# Patient Record
Sex: FEMALE | Race: WHITE | Employment: FULL TIME | URBAN - METROPOLITAN AREA
[De-identification: names, ages, dates, MRNs, and addresses within clinical notes are randomized per-mention and may not be internally consistent; named-entity substitution may affect disease eponyms.]

---

## 2017-02-13 ENCOUNTER — HOSPITAL ENCOUNTER (OUTPATIENT)
Age: 32
Discharge: OTHER INSTITUTION | End: 2017-02-13
Payer: COMMERCIAL

## 2017-02-13 ENCOUNTER — APPOINTMENT (OUTPATIENT)
Dept: ULTRASOUND IMAGING | Facility: HOSPITAL | Age: 32
End: 2017-02-13
Attending: EMERGENCY MEDICINE
Payer: COMMERCIAL

## 2017-02-13 ENCOUNTER — HOSPITAL ENCOUNTER (EMERGENCY)
Facility: HOSPITAL | Age: 32
Discharge: HOME | End: 2017-02-13
Attending: EMERGENCY MEDICINE
Payer: COMMERCIAL

## 2017-02-13 VITALS
BODY MASS INDEX: 36.16 KG/M2 | DIASTOLIC BLOOD PRESSURE: 70 MMHG | HEART RATE: 95 BPM | WEIGHT: 225 LBS | SYSTOLIC BLOOD PRESSURE: 133 MMHG | OXYGEN SATURATION: 100 % | RESPIRATION RATE: 18 BRPM | HEIGHT: 66 IN

## 2017-02-13 VITALS
HEIGHT: 66 IN | SYSTOLIC BLOOD PRESSURE: 130 MMHG | HEART RATE: 94 BPM | WEIGHT: 230 LBS | OXYGEN SATURATION: 100 % | BODY MASS INDEX: 36.96 KG/M2 | TEMPERATURE: 98 F | DIASTOLIC BLOOD PRESSURE: 70 MMHG | RESPIRATION RATE: 16 BRPM

## 2017-02-13 DIAGNOSIS — R10.9 ABDOMINAL PAIN OF UNKNOWN ETIOLOGY: Primary | ICD-10-CM

## 2017-02-13 DIAGNOSIS — R10.9 ABDOMINAL PAIN, ACUTE: Primary | ICD-10-CM

## 2017-02-13 LAB
B-HCG UR QL: NEGATIVE
BACTERIA UR QL AUTO: NEGATIVE /HPF
BILIRUB UR QL: NEGATIVE
COLOR UR: YELLOW
GLUCOSE UR-MCNC: NEGATIVE MG/DL
HGB UR QL STRIP.AUTO: NEGATIVE
KETONES UR-MCNC: NEGATIVE MG/DL
NITRITE UR QL STRIP.AUTO: NEGATIVE
PH UR: 6 [PH] (ref 5–8)
PROT UR-MCNC: NEGATIVE MG/DL
RBC #/AREA URNS AUTO: 1 /HPF
SP GR UR STRIP: 1.03 (ref 1–1.03)
UROBILINOGEN UR STRIP-ACNC: <2
VIT C UR-MCNC: NEGATIVE MG/DL
WBC #/AREA URNS AUTO: 2 /HPF

## 2017-02-13 PROCEDURE — 99284 EMERGENCY DEPT VISIT MOD MDM: CPT

## 2017-02-13 PROCEDURE — 93975 VASCULAR STUDY: CPT

## 2017-02-13 PROCEDURE — 76856 US EXAM PELVIC COMPLETE: CPT

## 2017-02-13 PROCEDURE — 76830 TRANSVAGINAL US NON-OB: CPT

## 2017-02-13 PROCEDURE — 81025 URINE PREGNANCY TEST: CPT

## 2017-02-13 PROCEDURE — 99215 OFFICE O/P EST HI 40 MIN: CPT

## 2017-02-13 PROCEDURE — 81001 URINALYSIS AUTO W/SCOPE: CPT

## 2017-02-13 RX ORDER — IBUPROFEN 600 MG/1
600 TABLET ORAL ONCE
Status: COMPLETED | OUTPATIENT
Start: 2017-02-13 | End: 2017-02-13

## 2017-02-13 RX ORDER — ACETAMINOPHEN 500 MG
1000 TABLET ORAL ONCE
Status: COMPLETED | OUTPATIENT
Start: 2017-02-13 | End: 2017-02-13

## 2017-02-14 NOTE — ED NOTES
Pt presents to ED from [address] [place] [address] for evaluation of LLQ pain. Pt states  Hx of ovarian cysts x2 on left side. Denies vaginal bleeding.

## 2017-02-14 NOTE — ED PROVIDER NOTES
Patient Seen in: [facility] In [place]    History   CC: abd pain  HPI: Julia Parikh 33year old female  who presents to the ER c/o periumbilical and left lower quadrant abdominal pain that has been constant in nature since yeste Current:/70 mmHg  Pulse 94  Temp(Src) 98.4 °F (36.9 °C) (Temporal)  Resp 16  Ht 167.6 cm (5' 6\")  Wt 104. 327 kg  BMI 37.14 kg/m2  SpO2 100%        General - Appears well and in NAD  Resp - Lung sounds clear bilaterally and wob unlabored, good

## 2017-02-14 NOTE — ED INITIAL ASSESSMENT (HPI)
LLQ/suprapubic pain starting yesterday, hx of ovarian cysts, pt suspects she has a cyst.  No meds taken PTA. Denies n/v/d/dysuria.

## 2017-02-14 NOTE — ED NOTES
Rn discussed case with Dr Miriam sEpinal, orders received. Urine collected and sent, pt now drinking to fill bladder. Pt instructed to notify triage RN when bladder is full.

## 2017-02-14 NOTE — ED PROVIDER NOTES
Patient Seen in: [facility] Emergency Department    History   Patient presents with:  Abdomen/Flank Pain (GI/)    Stated Complaint: abd pain    HPI    Patient complains of llq/inguinal  abdominal pain that began yesterday feels like previous ova air          Physical Exam  General Appearance: alert, no distress  Eyes: pupils equal and round no pallor or injection  ENT, Mouth: mucous membranes moist  Respiratory: there are no retractions, lungs are clear to auscultation  Cardiovascular: regular rat

## 2017-02-18 ENCOUNTER — HOSPITAL ENCOUNTER (OUTPATIENT)
Dept: CT IMAGING | Age: 32
Discharge: HOME | End: 2017-02-18
Attending: FAMILY MEDICINE
Payer: COMMERCIAL

## 2017-02-18 DIAGNOSIS — R10.9 ABDOMINAL PAIN: ICD-10-CM

## 2017-02-18 PROCEDURE — 74176 CT ABD & PELVIS W/O CONTRAST: CPT

## 2017-08-03 ENCOUNTER — OFFICE VISIT (OUTPATIENT)
Dept: FAMILY MEDICINE CLINIC | Facility: CLINIC | Age: 32
End: 2017-08-03

## 2017-08-03 VITALS
WEIGHT: 233 LBS | HEART RATE: 81 BPM | SYSTOLIC BLOOD PRESSURE: 113 MMHG | BODY MASS INDEX: 37.45 KG/M2 | HEIGHT: 66 IN | DIASTOLIC BLOOD PRESSURE: 76 MMHG

## 2017-08-03 DIAGNOSIS — Z00.00 ROUTINE PHYSICAL EXAMINATION: Primary | ICD-10-CM

## 2017-08-03 DIAGNOSIS — K57.93 DIVERTICULITIS OF INTESTINE WITH BLEEDING, UNSPECIFIED COMPLICATION STATUS, UNSPECIFIED PART OF INTESTINAL TRACT: ICD-10-CM

## 2017-08-03 PROCEDURE — 99395 PREV VISIT EST AGE 18-39: CPT | Performed by: FAMILY MEDICINE

## 2017-08-03 NOTE — PROGRESS NOTES
Blood pressure 113/76, pulse 81, height 5' 6\" (1.676 m), weight 233 lb (105.7 kg), currently breastfeeding. REASON FOR VISIT:    Tati Grimm is a 27year old female who presents for an [address]         Patient Active Problem List: found for: FOB, OCCULTSTOOL    Obesity Screening Screen all adults annually Body mass index is 37.61 kg/m².       Preventive Services for Which Recommendations Vary with Risk Recommendation Internal Lab or Procedure External Lab or Procedure   Cholesterol S on file.    MEDICAL INFORMATION:   Past Medical History:   Diagnosis Date   • Diverticulitis    • History of pregnancy 2013    HALLE Nesbitt, , APGAR:8 (1 min) 9 (5 min)  (10 min)  -2nd degree perineal laceration; Pregnancy management: -Ping Fossa / Tameka Pace lesions  HEENT: atraumatic, normocephalic, ears and throat are clear  EYES:PERRLA, EOMI, normal optic disk, conjunctiva are clear  NECK: supple, no adenopathy, no bruits  CHEST: no chest tenderness    LUNGS: clear to auscultation  CARDIO: RRR without murmu

## 2017-08-04 ENCOUNTER — OFFICE VISIT (OUTPATIENT)
Dept: DERMATOLOGY CLINIC | Facility: CLINIC | Age: 32
End: 2017-08-04

## 2017-08-04 DIAGNOSIS — D23.70 BENIGN NEOPLASM OF SKIN OF LOWER LIMB, INCLUDING HIP, UNSPECIFIED LATERALITY: ICD-10-CM

## 2017-08-04 DIAGNOSIS — D23.30 BENIGN NEOPLASM OF SKIN OF FACE: ICD-10-CM

## 2017-08-04 DIAGNOSIS — D23.4 BENIGN NEOPLASM OF SCALP AND SKIN OF NECK: ICD-10-CM

## 2017-08-04 DIAGNOSIS — D22.9 MULTIPLE NEVI: Primary | ICD-10-CM

## 2017-08-04 DIAGNOSIS — D23.5 BENIGN NEOPLASM OF SKIN OF TRUNK, EXCEPT SCROTUM: ICD-10-CM

## 2017-08-04 DIAGNOSIS — D23.60 BENIGN NEOPLASM OF SKIN OF UPPER LIMB, INCLUDING SHOULDER, UNSPECIFIED LATERALITY: ICD-10-CM

## 2017-08-04 PROCEDURE — 99213 OFFICE O/P EST LOW 20 MIN: CPT | Performed by: DERMATOLOGY

## 2017-08-04 PROCEDURE — 99212 OFFICE O/P EST SF 10 MIN: CPT | Performed by: DERMATOLOGY

## 2017-08-10 ENCOUNTER — APPOINTMENT (OUTPATIENT)
Dept: LAB | Age: 32
End: 2017-08-10
Attending: FAMILY MEDICINE
Payer: COMMERCIAL

## 2017-08-10 DIAGNOSIS — K57.93 DIVERTICULITIS OF INTESTINE WITH BLEEDING, UNSPECIFIED COMPLICATION STATUS, UNSPECIFIED PART OF INTESTINAL TRACT: ICD-10-CM

## 2017-08-10 DIAGNOSIS — Z00.00 ROUTINE PHYSICAL EXAMINATION: ICD-10-CM

## 2017-08-10 LAB
CHOLEST SERPL-MCNC: 214 MG/DL (ref 110–200)
GLUCOSE SERPL-MCNC: 84 MG/DL (ref 70–99)
HDLC SERPL-MCNC: 37 MG/DL
NONHDLC SERPL-MCNC: 177 MG/DL
TRIGL SERPL-MCNC: 450 MG/DL (ref 1–149)

## 2017-08-10 PROCEDURE — 36415 COLL VENOUS BLD VENIPUNCTURE: CPT

## 2017-08-10 PROCEDURE — 80061 LIPID PANEL: CPT

## 2017-08-10 PROCEDURE — 82947 ASSAY GLUCOSE BLOOD QUANT: CPT

## 2017-08-14 ENCOUNTER — TELEPHONE (OUTPATIENT)
Dept: OBGYN CLINIC | Facility: CLINIC | Age: 32
End: 2017-08-14

## 2017-08-14 NOTE — PROGRESS NOTES
Thony Bello is a 27year old female. HPI:     CC:  Patient presents with:  Full Skin Exam: LOV 7/2016. Pt here for annual skin exam. Concerned with 2 moles on back that look larger. Denies family and personal hx of skin ca.          Allergies:  Review Marital status:   Spouse name: N/A    Years of education: N/A  Number of children: N/A     Occupational History  None on file     Social History Main Topics   Smoking status: Never Smoker    Smokeless tobacco: Never Used    Alcohol use Yes    Commen scattered, cherry-red vascular papules scattered. See map today's date for lesions noted . Mid back lesions observed. Darker brown macules on back observe. No significant change in lesion left scapula.   Papular lesions at right interscapular back, cancer, ABCDE's of melanoma discussed with patient. Sunscreen use, sun protection, self exams reviewed. Followup as noted RTC routine checkup 6 mos - one year or p.r.n.     RTC one year or when necessary

## 2017-09-14 ENCOUNTER — OFFICE VISIT (OUTPATIENT)
Dept: GASTROENTEROLOGY | Facility: CLINIC | Age: 32
End: 2017-09-14

## 2017-09-14 ENCOUNTER — TELEPHONE (OUTPATIENT)
Dept: GASTROENTEROLOGY | Facility: CLINIC | Age: 32
End: 2017-09-14

## 2017-09-14 VITALS
DIASTOLIC BLOOD PRESSURE: 75 MMHG | BODY MASS INDEX: 37.18 KG/M2 | HEIGHT: 66 IN | HEART RATE: 85 BPM | WEIGHT: 231.38 LBS | SYSTOLIC BLOOD PRESSURE: 122 MMHG

## 2017-09-14 DIAGNOSIS — R10.32 ABDOMINAL DISCOMFORT IN LEFT LOWER QUADRANT: ICD-10-CM

## 2017-09-14 DIAGNOSIS — Z87.19 HISTORY OF DIVERTICULITIS OF COLON: Primary | ICD-10-CM

## 2017-09-14 PROCEDURE — 99244 OFF/OP CNSLTJ NEW/EST MOD 40: CPT | Performed by: INTERNAL MEDICINE

## 2017-09-14 NOTE — TELEPHONE ENCOUNTER
Scheduled for:  Colon  Provider Name:   Date:  9/18/17  Location:  [facility]  Sedation:   Iv sedation  Time:  8:45am, arrival 7:45am  Prep: Suprep  Meds/Allergies Reconciled?:  yes  Diagnosis with codes:  Hx of diverticulitis of colon Z87.19  Was patient informed

## 2017-09-14 NOTE — H&P
[facility] - Gastroenterology                                                                                                  Clinic History and Physical resolved      Past Surgical History:  1998, 2001: KNEE ARTHROSCOPY      Comment: knee cap damage; L knee pain  1998: OTHER SURGICAL HISTORY      Comment: L orthoscopic knee   Family Hx:   Family History   Problem Relation Age of Onset   • Heart Disease Mot perforation without drainable fluid collection treated with antibiotics which resolved and only now having some changes in bowel habits in regards to consistency and some abdominal discomfort in the LLQ.     Discussed possiblities of current symptoms being

## 2017-09-14 NOTE — PATIENT INSTRUCTIONS
1. Schedule colonoscopy with Dr. Griselda So with IV conscious sedation    2.  bowel prep from pharmacy (split suprep)    3. Continue all medications for procedure    4. Read all bowel prep instructions carefully    5.  AVOID seeds, nuts, popcorn, raw

## 2017-09-18 ENCOUNTER — SURGERY (OUTPATIENT)
Age: 32
End: 2017-09-18

## 2017-09-18 ENCOUNTER — HOSPITAL ENCOUNTER (OUTPATIENT)
Facility: HOSPITAL | Age: 32
Setting detail: HOSPITAL OUTPATIENT SURGERY
Discharge: HOME | End: 2017-09-18
Attending: INTERNAL MEDICINE | Admitting: INTERNAL MEDICINE
Payer: COMMERCIAL

## 2017-09-18 VITALS
WEIGHT: 231 LBS | HEART RATE: 94 BPM | SYSTOLIC BLOOD PRESSURE: 124 MMHG | DIASTOLIC BLOOD PRESSURE: 86 MMHG | HEIGHT: 66 IN | RESPIRATION RATE: 14 BRPM | BODY MASS INDEX: 37.12 KG/M2 | OXYGEN SATURATION: 99 %

## 2017-09-18 DIAGNOSIS — Z87.19 HISTORY OF DIVERTICULITIS OF COLON: ICD-10-CM

## 2017-09-18 DIAGNOSIS — R10.32 ABDOMINAL DISCOMFORT IN LEFT LOWER QUADRANT: ICD-10-CM

## 2017-09-18 LAB — B-HCG UR QL: NEGATIVE

## 2017-09-18 PROCEDURE — 0DBL8ZX EXCISION OF TRANSVERSE COLON, VIA NATURAL OR ARTIFICIAL OPENING ENDOSCOPIC, DIAGNOSTIC: ICD-10-PCS | Performed by: INTERNAL MEDICINE

## 2017-09-18 PROCEDURE — 45385 COLONOSCOPY W/LESION REMOVAL: CPT | Performed by: INTERNAL MEDICINE

## 2017-09-18 PROCEDURE — 99152 MOD SED SAME PHYS/QHP 5/>YRS: CPT | Performed by: INTERNAL MEDICINE

## 2017-09-18 RX ORDER — SODIUM CHLORIDE 0.9 % (FLUSH) 0.9 %
10 SYRINGE (ML) INJECTION AS NEEDED
Status: DISCONTINUED | OUTPATIENT
Start: 2017-09-18 | End: 2017-09-18

## 2017-09-18 RX ORDER — MIDAZOLAM HYDROCHLORIDE 1 MG/ML
1 INJECTION INTRAMUSCULAR; INTRAVENOUS EVERY 5 MIN PRN
Status: DISCONTINUED | OUTPATIENT
Start: 2017-09-18 | End: 2017-09-18

## 2017-09-18 RX ORDER — SODIUM CHLORIDE, SODIUM LACTATE, POTASSIUM CHLORIDE, CALCIUM CHLORIDE 600; 310; 30; 20 MG/100ML; MG/100ML; MG/100ML; MG/100ML
INJECTION, SOLUTION INTRAVENOUS CONTINUOUS
Status: DISCONTINUED | OUTPATIENT
Start: 2017-09-18 | End: 2017-09-18

## 2017-09-18 RX ORDER — MIDAZOLAM HYDROCHLORIDE 1 MG/ML
INJECTION INTRAMUSCULAR; INTRAVENOUS
Status: DISCONTINUED | OUTPATIENT
Start: 2017-09-18 | End: 2017-09-18

## 2017-09-18 NOTE — OPERATIVE REPORT
Colonoscopy Report    Mari Leonardo     1985 Age 27year old   PCP Cari Lozano MD, Ileana Shaffer MD Endoscopist Jamil Patrick MD     Date of procedure: 17    Procedure: Colonoscopy w/ cold snare polypectomy    Pre-operative diagnosis: history of di postoperative complications. The patient’s vital signs were monitored throughout the procedure and remained stable.     Estimated blood loss: insignificant    Specimens collected:  Colon polyp    Complications: none     Colonoscopy findings:  Terminal ileum

## 2017-09-18 NOTE — H&P
History & Physical Examination    Patient Name: Silvestre Hughes  MRN: [id number]  CSN: [id number]  YOB: 1985    Diagnosis: history of diverticulitis 2/2017      Prescriptions Prior to Admission:  Na Sulfate-K Sulfate-Mg Sulf (SUPREP BOWEL PRE reviewed the History and Physical done within the last 30 days. Any changes noted above.   Richa Upton MD  [address] [place] - Gastroenterology  9/18/2017  8:41 AM

## 2017-09-19 ENCOUNTER — TELEPHONE (OUTPATIENT)
Dept: GASTROENTEROLOGY | Facility: CLINIC | Age: 32
End: 2017-09-19

## 2017-09-19 NOTE — TELEPHONE ENCOUNTER
Recall colon in 5 years per. Colon done 9/18/17  Updates health maintenance  Entered into 5 yr CLN recall  I mailed out colonoscopy results letter to pt  RN Staff. Please recall patient for colonoscopy in 5 years.      Nichol Gavin

## 2017-09-22 ENCOUNTER — TELEPHONE (OUTPATIENT)
Dept: GASTROENTEROLOGY | Facility: CLINIC | Age: 32
End: 2017-09-22

## 2017-09-22 NOTE — TELEPHONE ENCOUNTER
Pt contacted and reviewed in detail Dr. Alee Parker results letter with her over the phone, she verbalized understanding of all and did not have further questions at this time. Letter reprinted and mailed to pt for her records.

## 2018-02-01 ENCOUNTER — OFFICE VISIT (OUTPATIENT)
Dept: OBGYN CLINIC | Facility: CLINIC | Age: 33
End: 2018-02-01

## 2018-02-01 VITALS
WEIGHT: 234 LBS | HEART RATE: 69 BPM | SYSTOLIC BLOOD PRESSURE: 112 MMHG | DIASTOLIC BLOOD PRESSURE: 72 MMHG | BODY MASS INDEX: 38 KG/M2

## 2018-02-01 DIAGNOSIS — N94.6 SEVERE DYSMENORRHEA: ICD-10-CM

## 2018-02-01 DIAGNOSIS — Z12.4 SCREENING FOR MALIGNANT NEOPLASM OF CERVIX: Primary | ICD-10-CM

## 2018-02-01 DIAGNOSIS — N92.0 MENORRHAGIA WITH REGULAR CYCLE: ICD-10-CM

## 2018-02-01 DIAGNOSIS — Z12.4 SCREENING FOR MALIGNANT NEOPLASM OF CERVIX: ICD-10-CM

## 2018-02-01 DIAGNOSIS — Z01.419 ENCOUNTER FOR GYNECOLOGICAL EXAMINATION WITHOUT ABNORMAL FINDING: ICD-10-CM

## 2018-02-01 DIAGNOSIS — Z01.419 ENCOUNTER FOR GYNECOLOGICAL EXAMINATION WITHOUT ABNORMAL FINDING: Primary | ICD-10-CM

## 2018-02-01 PROCEDURE — 99395 PREV VISIT EST AGE 18-39: CPT | Performed by: OBSTETRICS & GYNECOLOGY

## 2018-02-01 NOTE — PROGRESS NOTES
HPI:    Patient ID: Denisha Romeo is a 27year old female. HPI   with menses q 28d / 7d / heavy with severe dysmenorrhea for 4d. She does have to take off work about every other month for pain. Withdrawal for BC.  Discussing possible 3rd child or lesion on the left labia. Uterus is not enlarged and not tender. Cervix exhibits no motion tenderness and no discharge. Right adnexum displays no mass, no tenderness and no fullness. Left adnexum displays no mass, no tenderness and no fullness.  No vagin

## 2018-02-02 LAB — HPV I/H RISK 1 DNA SPEC QL NAA+PROBE: NEGATIVE

## 2018-02-08 NOTE — PROGRESS NOTES
HPI:    Patient ID: Krissy Massey is a 27year old female. HPI G2 [phone] with reg menses and withdrawal for Oberägeri. No complaints and not interested in other Oberägeri.      Review of Systems   Constitutional: Negative for appetite change, fatigue and unexpected discharge found. Musculoskeletal: She exhibits no edema or tenderness. Lymphadenopathy:     She has no cervical adenopathy. She has no axillary adenopathy. Right: No inguinal adenopathy present. Left: No inguinal adenopathy present.

## 2018-03-16 ENCOUNTER — OFFICE VISIT (OUTPATIENT)
Dept: FAMILY MEDICINE CLINIC | Facility: CLINIC | Age: 33
End: 2018-03-16

## 2018-03-16 VITALS
WEIGHT: 234 LBS | SYSTOLIC BLOOD PRESSURE: 117 MMHG | DIASTOLIC BLOOD PRESSURE: 74 MMHG | BODY MASS INDEX: 37.61 KG/M2 | HEART RATE: 86 BPM | HEIGHT: 66 IN | TEMPERATURE: 97 F

## 2018-03-16 DIAGNOSIS — J06.9 ACUTE URI: Primary | ICD-10-CM

## 2018-03-16 PROCEDURE — 99213 OFFICE O/P EST LOW 20 MIN: CPT | Performed by: NURSE PRACTITIONER

## 2018-03-17 NOTE — PROGRESS NOTES
HPI  Pt presents for cough for past 7-8 days with congestion; has had right ear pain for past 3 days. URI has been going through household past couple of weeks. Denies fever, sore throat, body aches.      Review of Systems   Constitutional: Negative for ac Obesity Mother    • Diabetes Maternal Grandmother    • Obesity Maternal Grandmother          Social History  Social History   Marital status:   Spouse name: N/A    Years of education: N/A  Number of children: N/A     Occupational History  None on fi Skin: Skin is warm and dry. Psychiatric: She has a normal mood and affect. Her behavior is normal. Judgment and thought content normal.   Nursing note and vitals reviewed. Assessment:     1. Acute URI        Plan:     1.  Supportive care discussed

## 2018-06-04 ENCOUNTER — OFFICE VISIT (OUTPATIENT)
Dept: FAMILY MEDICINE CLINIC | Facility: CLINIC | Age: 33
End: 2018-06-04

## 2018-06-04 VITALS
TEMPERATURE: 98 F | SYSTOLIC BLOOD PRESSURE: 121 MMHG | HEIGHT: 66 IN | WEIGHT: 230 LBS | BODY MASS INDEX: 36.96 KG/M2 | HEART RATE: 80 BPM | DIASTOLIC BLOOD PRESSURE: 74 MMHG

## 2018-06-04 DIAGNOSIS — K57.93 DIVERTICULITIS OF INTESTINE WITH BLEEDING, UNSPECIFIED COMPLICATION STATUS, UNSPECIFIED PART OF INTESTINAL TRACT: Primary | ICD-10-CM

## 2018-06-04 PROCEDURE — 99214 OFFICE O/P EST MOD 30 MIN: CPT | Performed by: FAMILY MEDICINE

## 2018-06-04 PROCEDURE — 99212 OFFICE O/P EST SF 10 MIN: CPT | Performed by: FAMILY MEDICINE

## 2018-06-04 RX ORDER — AMOXICILLIN AND CLAVULANATE POTASSIUM 875; 125 MG/1; MG/1
1 TABLET, FILM COATED ORAL 2 TIMES DAILY
Qty: 20 TABLET | Refills: 0 | Status: SHIPPED | OUTPATIENT
Start: 2018-06-04 | End: 2018-06-14

## 2018-06-04 NOTE — PROGRESS NOTES
No sob no fever  Left lower quant abd pain  Hx of diver with perf.   Had popcorn    Ht rrr no m  Lungs clear  Ext no edema  abd pos llq abd pain    A/p  Diverticulosis  abx   discussed diet tumeric  [place]  Probiotics  Bone broth soups  Majority of time was

## 2018-06-06 ENCOUNTER — TELEPHONE (OUTPATIENT)
Dept: OTHER | Age: 33
End: 2018-06-06

## 2018-06-06 NOTE — TELEPHONE ENCOUNTER
Patient at [address] on 6/4/18 with a diverticulitis flare up. States she is not in pain anymore but still having soft to runny stools frequently and would like to know when she can return to normal diet? Currently only having liquids and soft foods.  Please advis

## 2018-06-06 NOTE — TELEPHONE ENCOUNTER
Advised patient on Dr. Suarez Signs recommendation. Patient verbalized understanding. Advised to call back if needed.

## 2018-07-12 ENCOUNTER — OFFICE VISIT (OUTPATIENT)
Dept: FAMILY MEDICINE CLINIC | Facility: CLINIC | Age: 33
End: 2018-07-12

## 2018-07-12 VITALS
DIASTOLIC BLOOD PRESSURE: 76 MMHG | WEIGHT: 208.38 LBS | BODY MASS INDEX: 33.49 KG/M2 | SYSTOLIC BLOOD PRESSURE: 118 MMHG | HEART RATE: 68 BPM | HEIGHT: 66 IN

## 2018-07-12 DIAGNOSIS — D22.9 CHANGE IN MULTIPLE NEVI: Primary | ICD-10-CM

## 2018-07-12 DIAGNOSIS — Z00.00 ANNUAL PHYSICAL EXAM: ICD-10-CM

## 2018-07-12 DIAGNOSIS — E78.00 HIGH CHOLESTEROL: ICD-10-CM

## 2018-07-12 DIAGNOSIS — L57.0 SOLAR KERATOSIS: ICD-10-CM

## 2018-07-12 PROCEDURE — 99212 OFFICE O/P EST SF 10 MIN: CPT | Performed by: FAMILY MEDICINE

## 2018-07-12 RX ORDER — MULTIVITAMIN WITH IRON
250 TABLET ORAL
COMMUNITY
End: 2019-08-05 | Stop reason: ALTCHOICE

## 2018-07-12 NOTE — PROGRESS NOTES
Pt with long staing sun damaged skin  A some irritations  On arms and back    exam    Solar keratosis   And multiple nevi      A/p  (D22.9) Change in multiple nevi  (primary encounter diagnosis)  Plan: DERM - INTERNAL        Referral     (L57.0,  X32. Caye Puls)

## 2018-08-09 ENCOUNTER — NURSE TRIAGE (OUTPATIENT)
Dept: OTHER | Age: 33
End: 2018-08-09

## 2018-08-09 ENCOUNTER — HOSPITAL ENCOUNTER (OUTPATIENT)
Age: 33
Discharge: HOME | End: 2018-08-09
Attending: FAMILY MEDICINE
Payer: COMMERCIAL

## 2018-08-09 VITALS
HEART RATE: 79 BPM | OXYGEN SATURATION: 100 % | DIASTOLIC BLOOD PRESSURE: 64 MMHG | RESPIRATION RATE: 20 BRPM | HEIGHT: 66 IN | BODY MASS INDEX: 32.95 KG/M2 | WEIGHT: 205 LBS | TEMPERATURE: 98 F | SYSTOLIC BLOOD PRESSURE: 129 MMHG

## 2018-08-09 DIAGNOSIS — L08.9 SPLINTER OF LEFT FOOT WITH INFECTION, INITIAL ENCOUNTER: Primary | ICD-10-CM

## 2018-08-09 DIAGNOSIS — S90.852A SPLINTER OF LEFT FOOT WITH INFECTION, INITIAL ENCOUNTER: Primary | ICD-10-CM

## 2018-08-09 PROCEDURE — 99213 OFFICE O/P EST LOW 20 MIN: CPT

## 2018-08-09 PROCEDURE — 28190 REMOVAL OF FOOT FOREIGN BODY: CPT

## 2018-08-09 PROCEDURE — 99212 OFFICE O/P EST SF 10 MIN: CPT

## 2018-08-09 NOTE — ED INITIAL ASSESSMENT (HPI)
\"Stepped on something\" while vacuuming barefoot 10 days ago. Puncture wound to plantar aspect of left foot. Possible FB. Painful to apply pressure. Redness and swelling present. No fever.

## 2018-08-09 NOTE — TELEPHONE ENCOUNTER
Action Requested: Summary for Provider     []  Critical Lab, Recommendations Needed  [] Need Additional Advice  []   FYI    []   Need Orders  [] Need Medications Sent to Pharmacy  []  Other       SUMMARY: Patient advised Immediate care today/tomorrow.   No

## 2018-08-10 NOTE — ED PROVIDER NOTES
Patient Seen in: [address]    History   Patient presents with: Foot Pain    Stated Complaint: Foot Pain    HPI    Patient is here with a possible splinter in the left foot sole for the past 10 days.   Notices slight irri Physical Exam   Constitutional: She is oriented to person, place, and time. She appears well-developed and well-nourished. No distress. Musculoskeletal: Normal range of motion. She exhibits no edema, tenderness or deformity.    Neurological: She i

## 2018-08-10 NOTE — TELEPHONE ENCOUNTER
Spoke with patient who reports she did go to the urgent care clinic yesterday and the provider removed a staple from her foot and instructed her to follow-up with her primary care provider as needed.  Patient was instructed to return call to clinic if she d

## 2018-10-22 ENCOUNTER — IMMUNIZATION (OUTPATIENT)
Dept: FAMILY MEDICINE CLINIC | Facility: CLINIC | Age: 33
End: 2018-10-22

## 2018-10-22 DIAGNOSIS — Z23 NEED FOR VACCINATION: ICD-10-CM

## 2018-10-22 PROCEDURE — 90686 IIV4 VACC NO PRSV 0.5 ML IM: CPT | Performed by: FAMILY MEDICINE

## 2018-10-22 PROCEDURE — 90471 IMMUNIZATION ADMIN: CPT | Performed by: FAMILY MEDICINE

## 2018-12-05 ENCOUNTER — NURSE TRIAGE (OUTPATIENT)
Dept: OTHER | Age: 33
End: 2018-12-05

## 2018-12-05 ENCOUNTER — PATIENT MESSAGE (OUTPATIENT)
Dept: FAMILY MEDICINE CLINIC | Facility: CLINIC | Age: 33
End: 2018-12-05

## 2018-12-05 ENCOUNTER — TELEPHONE (OUTPATIENT)
Dept: GASTROENTEROLOGY | Facility: CLINIC | Age: 33
End: 2018-12-05

## 2018-12-05 NOTE — TELEPHONE ENCOUNTER
From: Neha Santacruz  To: Cheikh Bower DO  Sent: 12/5/2018 4:05 PM CST  Subject: Non-Urgent Medical Question    Hello,    This may be related to my diverticulitis and I am not in pain, but have had fresh red blood in my stool and some when I wipe

## 2018-12-05 NOTE — TELEPHONE ENCOUNTER
Regarding: Non-Urgent Medical Question  Contact: [phone]  ----- Message from Generic, Cluepediaadrienne sent at 12/5/2018  4:05 PM CST -----    Hello,    This may be related to my diverticulitis and I am not in pain, but have had fresh red blood in my stool and

## 2018-12-05 NOTE — TELEPHONE ENCOUNTER
I just saw this message.     GI Staff:    Please see if patient can come tomorrow to clinic at [facility] at 2:30 PM    Thanks    Sonal Marcos MD  [address] [facility] - Gastroenterology  12/5/2018  5:59 PM

## 2018-12-05 NOTE — TELEPHONE ENCOUNTER
Action Requested: Summary for Provider     []  Critical Lab, Recommendations Needed  [] Need Additional Advice  []   FYI    []   Need Orders  [] Need Medications Sent to Pharmacy  []  Other     SUMMARY: Pt seeking recommendations for blood in stool.     Spo

## 2018-12-06 ENCOUNTER — OFFICE VISIT (OUTPATIENT)
Dept: GASTROENTEROLOGY | Facility: CLINIC | Age: 33
End: 2018-12-06

## 2018-12-06 ENCOUNTER — LAB ENCOUNTER (OUTPATIENT)
Dept: LAB | Age: 33
End: 2018-12-06
Attending: FAMILY MEDICINE
Payer: COMMERCIAL

## 2018-12-06 VITALS
DIASTOLIC BLOOD PRESSURE: 75 MMHG | SYSTOLIC BLOOD PRESSURE: 129 MMHG | HEIGHT: 66 IN | WEIGHT: 210 LBS | BODY MASS INDEX: 33.75 KG/M2 | HEART RATE: 76 BPM

## 2018-12-06 DIAGNOSIS — K92.1 BLOOD IN THE STOOL: Primary | ICD-10-CM

## 2018-12-06 DIAGNOSIS — K92.1 BLOOD IN THE STOOL: ICD-10-CM

## 2018-12-06 PROCEDURE — 36415 COLL VENOUS BLD VENIPUNCTURE: CPT

## 2018-12-06 PROCEDURE — 99214 OFFICE O/P EST MOD 30 MIN: CPT | Performed by: INTERNAL MEDICINE

## 2018-12-06 PROCEDURE — 85025 COMPLETE CBC W/AUTO DIFF WBC: CPT

## 2018-12-06 NOTE — TELEPHONE ENCOUNTER
Pt contacted, received message below, is familiar with [address] [place] [address] [address] location.  Will make sure she has referral.

## 2018-12-06 NOTE — TELEPHONE ENCOUNTER
This entire message from Triage hi priority (rather than a phone encounter) was just seen. The entire message did not paste, so had to duplicate.  Left complete message on pt voicemail to call first thing in AM, as I had an appt for her tomorrow (Thurs) at Pt advised to go to ER for further evaluation. Pt states she does not thing she needs ER as last episodes of diverticulitis she had abdominal pain and no abdominal pain with these occurrences.     Pt seeking MD recommendations.   Please advise.

## 2018-12-06 NOTE — PROGRESS NOTES
[facility] - Gastroenterology                                                                                                  Clinic Progress Note    Patient pr pain   • OTHER SURGICAL HISTORY  1998    L orthoscopic knee      Family Hx:   Family History   Problem Relation Age of Onset   • Heart Disease Mother 39        CAD   • Hypertension Mother 39   • Diabetes Mother 39        DM   • Obesity Mother    • Diabetes stool    Discussed certainly most likely hemorrhoidal related and hard stool related especially given her unremarkable colonoscopy 1 year ago. Discussed with her plan to check CBC and start fiber supplement or miralax.  If persists over the next few weeks,

## 2018-12-06 NOTE — PATIENT INSTRUCTIONS
1. Get your blood test     2. Start miralax    Miralax  Start taking miralax (or generic equivalent) once a day, which you can buy over the counter. To take this, mix a 1/2 capful (17 g) of the powder in with any liquid.   If there is no improvement, you c

## 2018-12-06 NOTE — TELEPHONE ENCOUNTER
This message was sent by Dr Nj Quiñonez high priority, but not in a phone encounter, so was just seen.  I left complete voicemessage on pt's voicemail that I need a call back first thing tomorrow morning, as I put her in for Dr Christine Suarez tomorrow at t

## 2019-07-16 ENCOUNTER — LAB ENCOUNTER (OUTPATIENT)
Dept: LAB | Age: 34
End: 2019-07-16
Attending: FAMILY MEDICINE
Payer: COMMERCIAL

## 2019-07-16 ENCOUNTER — OFFICE VISIT (OUTPATIENT)
Dept: FAMILY MEDICINE CLINIC | Facility: CLINIC | Age: 34
End: 2019-07-16

## 2019-07-16 VITALS
RESPIRATION RATE: 18 BRPM | WEIGHT: 232 LBS | TEMPERATURE: 98 F | BODY MASS INDEX: 37.28 KG/M2 | SYSTOLIC BLOOD PRESSURE: 121 MMHG | HEIGHT: 66 IN | HEART RATE: 84 BPM | DIASTOLIC BLOOD PRESSURE: 77 MMHG

## 2019-07-16 DIAGNOSIS — Z87.19 HISTORY OF DIVERTICULITIS: ICD-10-CM

## 2019-07-16 DIAGNOSIS — Z00.00 ANNUAL PHYSICAL EXAM: ICD-10-CM

## 2019-07-16 DIAGNOSIS — E66.09 CLASS 2 OBESITY DUE TO EXCESS CALORIES WITHOUT SERIOUS COMORBIDITY WITH BODY MASS INDEX (BMI) OF 37.0 TO 37.9 IN ADULT: ICD-10-CM

## 2019-07-16 DIAGNOSIS — Z00.00 ANNUAL PHYSICAL EXAM: Primary | ICD-10-CM

## 2019-07-16 LAB
ALBUMIN SERPL-MCNC: 3.7 G/DL (ref 3.4–5)
ALBUMIN/GLOB SERPL: 1 {RATIO} (ref 1–2)
ALP LIVER SERPL-CCNC: 74 U/L (ref 37–98)
ALT SERPL-CCNC: 22 U/L (ref 13–56)
ANION GAP SERPL CALC-SCNC: 7 MMOL/L (ref 0–18)
AST SERPL-CCNC: 12 U/L (ref 15–37)
BASOPHILS # BLD AUTO: 0.05 X10(3) UL (ref 0–0.2)
BASOPHILS NFR BLD AUTO: 0.6 %
BILIRUB SERPL-MCNC: 0.4 MG/DL (ref 0.1–2)
BUN BLD-MCNC: 16 MG/DL (ref 7–18)
BUN/CREAT SERPL: 17.8 (ref 10–20)
CALCIUM BLD-MCNC: 8.9 MG/DL (ref 8.5–10.1)
CHLORIDE SERPL-SCNC: 107 MMOL/L (ref 98–112)
CHOLEST SMN-MCNC: 213 MG/DL (ref ?–200)
CO2 SERPL-SCNC: 25 MMOL/L (ref 21–32)
CREAT BLD-MCNC: 0.9 MG/DL (ref 0.55–1.02)
DEPRECATED RDW RBC AUTO: 43.4 FL (ref 35.1–46.3)
EOSINOPHIL # BLD AUTO: 0.14 X10(3) UL (ref 0–0.7)
EOSINOPHIL NFR BLD AUTO: 1.8 %
ERYTHROCYTE [DISTWIDTH] IN BLOOD BY AUTOMATED COUNT: 12.9 % (ref 11–15)
GLOBULIN PLAS-MCNC: 3.8 G/DL (ref 2.8–4.4)
GLUCOSE BLD-MCNC: 88 MG/DL (ref 70–99)
HCT VFR BLD AUTO: 41 % (ref 35–48)
HDLC SERPL-MCNC: 39 MG/DL (ref 40–59)
HGB BLD-MCNC: 13.7 G/DL (ref 12–16)
IMM GRANULOCYTES # BLD AUTO: 0.03 X10(3) UL (ref 0–1)
IMM GRANULOCYTES NFR BLD: 0.4 %
LDLC SERPL CALC-MCNC: 107 MG/DL (ref ?–100)
LYMPHOCYTES # BLD AUTO: 1.89 X10(3) UL (ref 1–4)
LYMPHOCYTES NFR BLD AUTO: 24.1 %
M PROTEIN MFR SERPL ELPH: 7.5 G/DL (ref 6.4–8.2)
MCH RBC QN AUTO: 30.8 PG (ref 26–34)
MCHC RBC AUTO-ENTMCNC: 33.4 G/DL (ref 31–37)
MCV RBC AUTO: 92.1 FL (ref 80–100)
MONOCYTES # BLD AUTO: 0.5 X10(3) UL (ref 0.1–1)
MONOCYTES NFR BLD AUTO: 6.4 %
NEUTROPHILS # BLD AUTO: 5.24 X10 (3) UL (ref 1.5–7.7)
NEUTROPHILS # BLD AUTO: 5.24 X10(3) UL (ref 1.5–7.7)
NEUTROPHILS NFR BLD AUTO: 66.7 %
NONHDLC SERPL-MCNC: 174 MG/DL (ref ?–130)
OSMOLALITY SERPL CALC.SUM OF ELEC: 289 MOSM/KG (ref 275–295)
PATIENT FASTING: YES
PATIENT FASTING: YES
PLATELET # BLD AUTO: 277 10(3)UL (ref 150–450)
POTASSIUM SERPL-SCNC: 4.3 MMOL/L (ref 3.5–5.1)
RBC # BLD AUTO: 4.45 X10(6)UL (ref 3.8–5.3)
SODIUM SERPL-SCNC: 139 MMOL/L (ref 136–145)
TRIGL SERPL-MCNC: 333 MG/DL (ref 30–149)
TSI SER-ACNC: 2.33 MIU/ML (ref 0.36–3.74)
VLDLC SERPL CALC-MCNC: 67 MG/DL (ref 0–30)
WBC # BLD AUTO: 7.9 X10(3) UL (ref 4–11)

## 2019-07-16 PROCEDURE — 85025 COMPLETE CBC W/AUTO DIFF WBC: CPT

## 2019-07-16 PROCEDURE — 82306 VITAMIN D 25 HYDROXY: CPT

## 2019-07-16 PROCEDURE — 84443 ASSAY THYROID STIM HORMONE: CPT

## 2019-07-16 PROCEDURE — 99395 PREV VISIT EST AGE 18-39: CPT | Performed by: FAMILY MEDICINE

## 2019-07-16 PROCEDURE — 80053 COMPREHEN METABOLIC PANEL: CPT

## 2019-07-16 PROCEDURE — 80061 LIPID PANEL: CPT

## 2019-07-16 PROCEDURE — 36415 COLL VENOUS BLD VENIPUNCTURE: CPT

## 2019-07-16 NOTE — PROGRESS NOTES
Stable    Patient's past medical surgical family social history was reviewed. Review of Systems    Allergic: no environmental allergies or food allergies  Cardiovascular: no chest pain, irregular heartbeat, or palpitations.   Constitutional: no fever o Future  - ASSAY, THYROID STIM HORMONE; Future  - COMP METABOLIC PANEL (14); Future  - CBC WITH DIFFERENTIAL WITH PLATELET; Future    3.  Class 2 obesity due to excess calories without serious comorbidity with body mass index (BMI) of 37.0 to 37.9 in adult

## 2019-07-17 LAB — 25(OH)D3 SERPL-MCNC: 31.1 NG/ML (ref 30–100)

## 2019-08-01 ENCOUNTER — TELEPHONE (OUTPATIENT)
Dept: FAMILY MEDICINE CLINIC | Facility: CLINIC | Age: 34
End: 2019-08-01

## 2019-08-01 ENCOUNTER — HOSPITAL ENCOUNTER (OUTPATIENT)
Age: 34
Discharge: HOME | End: 2019-08-01
Attending: FAMILY MEDICINE
Payer: COMMERCIAL

## 2019-08-01 VITALS
RESPIRATION RATE: 20 BRPM | HEIGHT: 66 IN | WEIGHT: 220 LBS | OXYGEN SATURATION: 98 % | HEART RATE: 86 BPM | TEMPERATURE: 98 F | DIASTOLIC BLOOD PRESSURE: 77 MMHG | BODY MASS INDEX: 35.36 KG/M2 | SYSTOLIC BLOOD PRESSURE: 129 MMHG

## 2019-08-01 DIAGNOSIS — D22.9 CHANGE IN MULTIPLE NEVI: Primary | ICD-10-CM

## 2019-08-01 DIAGNOSIS — H69.83 EUSTACHIAN TUBE DYSFUNCTION, BILATERAL: Primary | ICD-10-CM

## 2019-08-01 DIAGNOSIS — L57.0 SOLAR KERATOSIS: ICD-10-CM

## 2019-08-01 PROCEDURE — 99212 OFFICE O/P EST SF 10 MIN: CPT

## 2019-08-01 NOTE — ED PROVIDER NOTES
Patient Seen in: [facility]    History   Patient presents with:  Ear Problem    Stated Complaint: clogged right ear and ringing    HPI    Pt is a 38 yo with a 1 week h/o ear issues. She does not have a cold or allergies. kg/m²         Physical Exam   Constitutional: She is oriented to person, place, and time. She appears well-developed and well-nourished. HENT:   Head: Normocephalic and atraumatic.    Right Ear: External ear normal.   Left Ear: External ear normal.   Mout

## 2019-08-01 NOTE — ED INITIAL ASSESSMENT (HPI)
PATIENT ARRIVED AMBULATORY TO ROOM C/O RIGHT EAR \"RINGING/ECHOING\" THAT STARTED 1 HOUR AGO. PATIENT DENIES PAIN BUT DESCRIBES IT AS A \"DISCOMFORT\" NO NASAL CONGESTION. NO FEVERS. NO COUGH. NO RECENT SWIMMING.

## 2019-08-01 NOTE — TELEPHONE ENCOUNTER
Patient called requesting referral to Dr Miles Schuster, Dermatology. Please sign off if agree.   Thank you,  [address]

## 2019-08-05 ENCOUNTER — TELEPHONE (OUTPATIENT)
Dept: FAMILY MEDICINE CLINIC | Facility: CLINIC | Age: 34
End: 2019-08-05

## 2019-08-05 ENCOUNTER — OFFICE VISIT (OUTPATIENT)
Dept: DERMATOLOGY CLINIC | Facility: CLINIC | Age: 34
End: 2019-08-05

## 2019-08-05 DIAGNOSIS — L81.4 SOLAR LENTIGO: ICD-10-CM

## 2019-08-05 DIAGNOSIS — D48.5 NEOPLASM OF UNCERTAIN BEHAVIOR OF SKIN: Primary | ICD-10-CM

## 2019-08-05 DIAGNOSIS — D22.9 MULTIPLE NEVI: ICD-10-CM

## 2019-08-05 DIAGNOSIS — L57.8 SUN-DAMAGED SKIN: ICD-10-CM

## 2019-08-05 PROCEDURE — 11102 TANGNTL BX SKIN SINGLE LES: CPT | Performed by: DERMATOLOGY

## 2019-08-05 PROCEDURE — 11103 TANGNTL BX SKIN EA SEP/ADDL: CPT | Performed by: DERMATOLOGY

## 2019-08-05 PROCEDURE — 99213 OFFICE O/P EST LOW 20 MIN: CPT | Performed by: DERMATOLOGY

## 2019-08-05 PROCEDURE — 88305 TISSUE EXAM BY PATHOLOGIST: CPT | Performed by: DERMATOLOGY

## 2019-08-05 NOTE — TELEPHONE ENCOUNTER
Hari Vargas, patient is scheduled to see Dr. Chase Louder today, referral is being requested. Please sign off on referral.     ** [facility] is currently out of the office.

## 2019-08-05 NOTE — TELEPHONE ENCOUNTER
Please update PCP listed on patient's chart to Dr. Alejo Medina. Patient stating Dr Dina Loredo is no longer primary care.       Thank you,  [facility]

## 2019-08-05 NOTE — PROGRESS NOTES
HPI:     Chief Complaint     Other        HPI     Other      Additional comments: pt c/o new moles, spots, skin check,  NO HX of skin cancer           Last edited by Drew Michael, [facility] Brittney on 8/5/2019  1:47 PM. (History)          Patient presents for full skin Procedure Laterality Date   • COLONOSCOPY  09/18/2017   • COLONOSCOPY N/A 9/18/2017    Performed by Kenisha Clifford MD at [address] [place] [address] ENDOSCOPY   • KNEE ARTHROSCOPY  1998, 2001    knee cap damage; L knee pain   • OTHER SURGICAL HISTORY  1998    L orthoscopic Weight Concern: Not Asked        Special Diet: Not Asked        Back Care: Not Asked        Exercise: Not Asked        Bike Helmet: Not Asked        Seat Belt: Not Asked        Self-Exams: Not Asked        Grew up on a farm: Not Asked        History of ta behavior of skin  (primary encounter diagnosis) rule out inflamed keratosis dermatofibroma or less likely atypical melanocytic lesion of right thigh–-shave biopsy is performed- See the operative note. Postop instructions given.   Further plan pending patho

## 2019-09-10 ENCOUNTER — OFFICE VISIT (OUTPATIENT)
Dept: SURGERY | Facility: CLINIC | Age: 34
End: 2019-09-10

## 2019-09-10 VITALS
DIASTOLIC BLOOD PRESSURE: 74 MMHG | HEIGHT: 66 IN | BODY MASS INDEX: 38.09 KG/M2 | OXYGEN SATURATION: 98 % | RESPIRATION RATE: 16 BRPM | WEIGHT: 237 LBS | HEART RATE: 86 BPM | SYSTOLIC BLOOD PRESSURE: 127 MMHG

## 2019-09-10 DIAGNOSIS — F43.9 STRESS: ICD-10-CM

## 2019-09-10 DIAGNOSIS — Z51.81 ENCOUNTER FOR THERAPEUTIC DRUG MONITORING: ICD-10-CM

## 2019-09-10 DIAGNOSIS — E78.2 HYPERCHOLESTEROLEMIA WITH HYPERTRIGLYCERIDEMIA: Primary | ICD-10-CM

## 2019-09-10 DIAGNOSIS — R63.5 WEIGHT GAIN: ICD-10-CM

## 2019-09-10 DIAGNOSIS — E55.9 VITAMIN D DEFICIENCY: ICD-10-CM

## 2019-09-10 DIAGNOSIS — E66.9 OBESITY (BMI 30-39.9): ICD-10-CM

## 2019-09-10 PROCEDURE — 99204 OFFICE O/P NEW MOD 45 MIN: CPT | Performed by: INTERNAL MEDICINE

## 2019-09-10 RX ORDER — TOPIRAMATE 25 MG/1
25 TABLET ORAL EVERY EVENING
Qty: 30 TABLET | Refills: 2 | Status: SHIPPED | OUTPATIENT
Start: 2019-09-10 | End: 2020-01-27

## 2019-09-10 NOTE — PROGRESS NOTES
[facility] Consultation Note       Date of Consult:  9/10/2019    Patient:  Brandon Polk  :      1985  MRN:      [id number]    Referring Provider: Dr. Garo Esparza       Chief Complaint:  Patient presents with:  Consult  Weight M Disp:  Rfl:    Omega-3 Fatty Acids (FISH OIL OMEGA-3 OR) Take by mouth. Disp:  Rfl:        Allergies:  Patient has no known allergies.      Comorbidities:  hyperchol and hypertrig    Social History:  Social History    Socioeconomic History      Marital stat Back Care: Not Asked        Exercise: Not Asked        Bike Helmet: Not Asked        Seat Belt: Not Asked        Self-Exams: Not Asked        Grew up on a farm: Not Asked        History of tanning: Not Asked        Outdoor occupation: Not Asked        Pt h for eating and manage cues and Eat slowly and take 20 to 30 minutes to complete each meal      ROS:  Constitutional: positive for fatigue  Respiratory: negative  Cardiovascular: negative  Gastrointestinal: negative  Musculoskeletal:positive for arthralgias CHOLEST 213 (H) 07/16/2019 09:12 AM     (H) 07/16/2019 09:12 AM    HDL 39 (L) 07/16/2019 09:12 AM    TRIG 333 (H) 07/16/2019 09:12 AM    VLDL 67 (H) 07/16/2019 09:12 AM       NON SUICIDAL DEPRESSION: Mood stable on current medication.  No changes ini gain  -     Lisdexamfetamine Dimesylate (VYVANSE) 20 MG Oral Cap; Take 1 capsule (20 mg total) by mouth every morning.  -     EKG 12-LEAD;  Future  -     LEPTIN, SERUM; Future  -     VITAMIN D, 25-HYDROXY; Future  -     VITAMIN B12; Future    Vitamin D defi

## 2019-09-11 ENCOUNTER — APPOINTMENT (OUTPATIENT)
Dept: LAB | Age: 34
End: 2019-09-11
Attending: INTERNAL MEDICINE
Payer: COMMERCIAL

## 2019-09-11 DIAGNOSIS — E78.2 HYPERCHOLESTEROLEMIA WITH HYPERTRIGLYCERIDEMIA: ICD-10-CM

## 2019-09-11 DIAGNOSIS — R63.5 WEIGHT GAIN: ICD-10-CM

## 2019-09-11 DIAGNOSIS — Z51.81 ENCOUNTER FOR THERAPEUTIC DRUG MONITORING: ICD-10-CM

## 2019-09-11 DIAGNOSIS — E66.9 OBESITY (BMI 30-39.9): ICD-10-CM

## 2019-09-11 DIAGNOSIS — F43.9 STRESS: ICD-10-CM

## 2019-09-11 DIAGNOSIS — E55.9 VITAMIN D DEFICIENCY: ICD-10-CM

## 2019-09-11 LAB — VIT B12 SERPL-MCNC: 884 PG/ML (ref 193–986)

## 2019-09-11 PROCEDURE — 36415 COLL VENOUS BLD VENIPUNCTURE: CPT

## 2019-09-11 PROCEDURE — 82306 VITAMIN D 25 HYDROXY: CPT

## 2019-09-11 PROCEDURE — 93010 ELECTROCARDIOGRAM REPORT: CPT | Performed by: INTERNAL MEDICINE

## 2019-09-11 PROCEDURE — 82397 CHEMILUMINESCENT ASSAY: CPT

## 2019-09-11 PROCEDURE — 82607 VITAMIN B-12: CPT

## 2019-09-11 PROCEDURE — 93005 ELECTROCARDIOGRAM TRACING: CPT

## 2019-09-12 ENCOUNTER — TELEPHONE (OUTPATIENT)
Dept: SURGERY | Facility: CLINIC | Age: 34
End: 2019-09-12

## 2019-09-12 NOTE — TELEPHONE ENCOUNTER
Pt left message on office VM stating that she had EKG done yesterday as ordered. Pt wondering if and when she should start Vyvanse. Please call pt to advise.

## 2019-09-13 LAB — 25(OH)D3 SERPL-MCNC: 24.7 NG/ML (ref 30–100)

## 2019-09-16 LAB — LEPTIN: 22.9 NG/ML

## 2019-09-23 ENCOUNTER — TELEPHONE (OUTPATIENT)
Dept: SURGERY | Facility: CLINIC | Age: 34
End: 2019-09-23

## 2019-09-23 NOTE — TELEPHONE ENCOUNTER
Patient left voicemail,wanting to know if she had to avoid having alcohol.since she is taking vyvanse

## 2019-09-23 NOTE — TELEPHONE ENCOUNTER
Left message on her voice    Advised to be careful when driking etoh and taking vyvanse  It is not advised

## 2019-09-30 DIAGNOSIS — E78.2 HYPERCHOLESTEROLEMIA WITH HYPERTRIGLYCERIDEMIA: ICD-10-CM

## 2019-09-30 DIAGNOSIS — Z51.81 ENCOUNTER FOR THERAPEUTIC DRUG MONITORING: ICD-10-CM

## 2019-09-30 DIAGNOSIS — E66.9 OBESITY (BMI 30-39.9): ICD-10-CM

## 2019-09-30 DIAGNOSIS — F43.9 STRESS: ICD-10-CM

## 2019-09-30 DIAGNOSIS — R63.5 WEIGHT GAIN: ICD-10-CM

## 2019-10-01 RX ORDER — LISDEXAMFETAMINE DIMESYLATE CAPSULES 20 MG/1
20 CAPSULE ORAL EVERY MORNING
Qty: 30 CAPSULE | Refills: 0 | OUTPATIENT
Start: 2019-10-01

## 2019-10-21 ENCOUNTER — PATIENT MESSAGE (OUTPATIENT)
Dept: FAMILY MEDICINE CLINIC | Facility: CLINIC | Age: 34
End: 2019-10-21

## 2019-10-22 ENCOUNTER — OFFICE VISIT (OUTPATIENT)
Dept: SURGERY | Facility: CLINIC | Age: 34
End: 2019-10-22

## 2019-10-22 ENCOUNTER — TELEPHONE (OUTPATIENT)
Dept: FAMILY MEDICINE CLINIC | Facility: CLINIC | Age: 34
End: 2019-10-22

## 2019-10-22 VITALS
HEIGHT: 66 IN | BODY MASS INDEX: 37.12 KG/M2 | OXYGEN SATURATION: 98 % | SYSTOLIC BLOOD PRESSURE: 137 MMHG | WEIGHT: 231 LBS | HEART RATE: 81 BPM | DIASTOLIC BLOOD PRESSURE: 89 MMHG

## 2019-10-22 DIAGNOSIS — E66.09 CLASS 2 OBESITY DUE TO EXCESS CALORIES WITHOUT SERIOUS COMORBIDITY WITH BODY MASS INDEX (BMI) OF 37.0 TO 37.9 IN ADULT: Primary | ICD-10-CM

## 2019-10-22 DIAGNOSIS — E66.9 OBESITY (BMI 30-39.9): ICD-10-CM

## 2019-10-22 DIAGNOSIS — F43.9 STRESS: ICD-10-CM

## 2019-10-22 DIAGNOSIS — E78.2 HYPERCHOLESTEROLEMIA WITH HYPERTRIGLYCERIDEMIA: Primary | ICD-10-CM

## 2019-10-22 DIAGNOSIS — Z51.81 ENCOUNTER FOR THERAPEUTIC DRUG MONITORING: ICD-10-CM

## 2019-10-22 PROCEDURE — 99214 OFFICE O/P EST MOD 30 MIN: CPT | Performed by: INTERNAL MEDICINE

## 2019-10-22 RX ORDER — ESCITALOPRAM OXALATE 5 MG/1
5 TABLET ORAL DAILY
Qty: 30 TABLET | Refills: 2 | Status: SHIPPED | OUTPATIENT
Start: 2019-10-22 | End: 2020-01-15

## 2019-10-22 NOTE — PROGRESS NOTES
Per patient she needs additional visits to see Dr. Cynthia Lozada. She is scheduled for today. Please advise.

## 2019-10-22 NOTE — TELEPHONE ENCOUNTER
Regarding: Referral Request  ----- Message from Jose F Byrd sent at 10/22/2019  9:58 AM CDT -----       ----- Message from Jarred Jason to Zoran Porter DO sent at 10/21/2019  5:34 PM -----   Hi Dr. Johnny Hale, I am sorry for the last minute r

## 2019-10-22 NOTE — PROGRESS NOTES
[address]  Dept: [phone]       Patient:  Yoselin Marino  :      1985  MRN:      [id number]    Chief Complaint:  Patient presents , Rfl: Allergies:  Patient has no known allergies.      Social History:  Social History    Socioeconomic History      Marital status:       Spouse name: Not on file      Number of children: Not on file      Years of education: Not on file Self-Exams: Not Asked        Grew up on a farm: Not Asked        History of tanning: Not Asked        Outdoor occupation: Not Asked        Pt has a pacemaker: No        Pt has a defibrillator: No        Breast feeding: Not Asked        Reaction to local an meals, Utlize portion control strategies to reduce calorie intake, Identify triggers for eating and manage cues and Eat slowly and take 20 to 30 minutes to complete each meal    Exercise Goals Reviewed and Discussed        ROS:    Constitutional: positive stable.     Lab Results   Component Value Date/Time    CHOLEST 213 (H) 07/16/2019 09:12 AM     (H) 07/16/2019 09:12 AM    HDL 39 (L) 07/16/2019 09:12 AM    TRIG 333 (H) 07/16/2019 09:12 AM    VLDL 67 (H) 07/16/2019 09:12 AM       Stress: admits to ea

## 2019-12-26 ENCOUNTER — HOSPITAL ENCOUNTER (OUTPATIENT)
Age: 34
Discharge: HOME | End: 2019-12-26
Payer: COMMERCIAL

## 2019-12-26 VITALS
RESPIRATION RATE: 18 BRPM | BODY MASS INDEX: 35 KG/M2 | TEMPERATURE: 98 F | DIASTOLIC BLOOD PRESSURE: 74 MMHG | OXYGEN SATURATION: 98 % | HEART RATE: 86 BPM | WEIGHT: 215 LBS | SYSTOLIC BLOOD PRESSURE: 130 MMHG

## 2019-12-26 DIAGNOSIS — J06.9 UPPER RESPIRATORY TRACT INFECTION, UNSPECIFIED TYPE: Primary | ICD-10-CM

## 2019-12-26 PROCEDURE — 99214 OFFICE O/P EST MOD 30 MIN: CPT

## 2019-12-26 PROCEDURE — 87430 STREP A AG IA: CPT

## 2019-12-26 PROCEDURE — 99213 OFFICE O/P EST LOW 20 MIN: CPT

## 2019-12-26 RX ORDER — AZITHROMYCIN 250 MG/1
TABLET, FILM COATED ORAL
Qty: 1 PACKAGE | Refills: 0 | Status: SHIPPED | OUTPATIENT
Start: 2019-12-26 | End: 2019-12-31

## 2019-12-26 NOTE — ED PROVIDER NOTES
Patient presents with:  Sore Throat      HPI:     Brandon Polk is a 37year old female with no significant past medical presents with a chief complaint of sore throat, cough, runny nose which started 3 days ago. No posterior neck pain or stiffness.   Rosiland Fallow requesting azithromycin which she states works best for her. We also discussed supportive care and close follow-up. Patient verbalized plan of care and states understanding.       Orders Placed This Encounter      POCT Rapid Strep Once      POCT Rapid Str

## 2019-12-26 NOTE — ED INITIAL ASSESSMENT (HPI)
Woke up this morning with sore throat. No fever. + congestion and cough. No nausea or dizziness. Daughter with same symptoms.

## 2020-01-13 ENCOUNTER — OFFICE VISIT (OUTPATIENT)
Dept: FAMILY MEDICINE CLINIC | Facility: CLINIC | Age: 35
End: 2020-01-13

## 2020-01-13 ENCOUNTER — TELEPHONE (OUTPATIENT)
Dept: FAMILY MEDICINE CLINIC | Facility: CLINIC | Age: 35
End: 2020-01-13

## 2020-01-13 ENCOUNTER — HOSPITAL ENCOUNTER (OUTPATIENT)
Dept: GENERAL RADIOLOGY | Age: 35
Discharge: HOME | End: 2020-01-13
Attending: FAMILY MEDICINE
Payer: COMMERCIAL

## 2020-01-13 VITALS
HEART RATE: 82 BPM | RESPIRATION RATE: 22 BRPM | SYSTOLIC BLOOD PRESSURE: 113 MMHG | HEIGHT: 66 IN | WEIGHT: 223 LBS | BODY MASS INDEX: 35.84 KG/M2 | DIASTOLIC BLOOD PRESSURE: 75 MMHG

## 2020-01-13 DIAGNOSIS — R10.32 LEFT INGUINAL PAIN: ICD-10-CM

## 2020-01-13 DIAGNOSIS — R10.32 LEFT INGUINAL PAIN: Primary | ICD-10-CM

## 2020-01-13 PROCEDURE — 99213 OFFICE O/P EST LOW 20 MIN: CPT | Performed by: FAMILY MEDICINE

## 2020-01-13 PROCEDURE — 73502 X-RAY EXAM HIP UNI 2-3 VIEWS: CPT | Performed by: FAMILY MEDICINE

## 2020-01-13 RX ORDER — NAPROXEN 500 MG/1
500 TABLET ORAL 2 TIMES DAILY WITH MEALS
Qty: 60 TABLET | Refills: 0 | Status: SHIPPED | OUTPATIENT
Start: 2020-01-13 | End: 2020-03-09

## 2020-01-13 NOTE — TELEPHONE ENCOUNTER
Pharmacy calling for clarification regarding message below and drug interaction. Need okay to dispense.

## 2020-01-13 NOTE — TELEPHONE ENCOUNTER
pharm calling about drug interaction with Naproxen and Escitalopram, which will possibly increase internal GI bleeding.  I transferred the call to RN Triage

## 2020-01-13 NOTE — PROGRESS NOTES
Left hip   Few months  Worse wihen lying down no comfortable position  Upper inguinal region  No back pain. Exam  Normal hip mild pain with abduction  Back normal  No pain over bursa  No inguinal hernia    A/p  Left hip pain   Psoas?   Pt.  meds  F/u 3

## 2020-01-14 DIAGNOSIS — R10.32 LEFT INGUINAL PAIN: Primary | ICD-10-CM

## 2020-01-15 DIAGNOSIS — F43.9 STRESS: ICD-10-CM

## 2020-01-15 RX ORDER — ESCITALOPRAM OXALATE 5 MG/1
TABLET ORAL
Qty: 30 TABLET | Refills: 2 | Status: SHIPPED | OUTPATIENT
Start: 2020-01-15 | End: 2020-02-10 | Stop reason: DRUGHIGH

## 2020-01-21 ENCOUNTER — OFFICE VISIT (OUTPATIENT)
Dept: PHYSICAL THERAPY | Age: 35
End: 2020-01-21
Attending: FAMILY MEDICINE
Payer: COMMERCIAL

## 2020-01-21 DIAGNOSIS — R10.32 LEFT INGUINAL PAIN: ICD-10-CM

## 2020-01-21 PROCEDURE — 97110 THERAPEUTIC EXERCISES: CPT

## 2020-01-21 PROCEDURE — 97161 PT EVAL LOW COMPLEX 20 MIN: CPT

## 2020-01-21 NOTE — PROGRESS NOTES
LUMBAR SPINE EVALUATION:   Referring Physician: Dr. Iveth Morataya  Date of Onset: Nov 2019 Date of Service: 1/21/2020    Left inguinal pain (R10.32)  PATIENT SUMMARY:   Kendell Gramajo is a 37year old y/o female who presents to therapy today with complaint score and clinical rationale, this evaluation involved Low Complexity decision making due to 1-2 personal factors/comorbidities, 3 body structures involved/activity limitations, and evolving symptoms including changing pain levels.     Precautions: None improve therapy outcome and self manage symptoms. 2. Pt will demonstrate improved L hip strength > 4+/5 to be able to negotiate stairs reciprocally painfree. 3. Pt will be able to tolerate sitting >2 hours painfree to be able to sit at work.     Colin Westfall

## 2020-01-23 ENCOUNTER — OFFICE VISIT (OUTPATIENT)
Dept: PHYSICAL THERAPY | Age: 35
End: 2020-01-23
Attending: FAMILY MEDICINE
Payer: COMMERCIAL

## 2020-01-23 PROCEDURE — 97110 THERAPEUTIC EXERCISES: CPT

## 2020-01-23 NOTE — PROGRESS NOTES
Dx:  Left inguinal pain (R10.32)        Insurance (Authorized # of Visits):  [address] exp 4/17/20   POC visits: 6  Authorizing Physician: Dr. Gio Marino  Next MD visit: none scheduled  Fall Risk: standard         Precautions:       FOTO initial: status- 62

## 2020-01-27 ENCOUNTER — OFFICE VISIT (OUTPATIENT)
Dept: ORTHOPEDICS CLINIC | Facility: CLINIC | Age: 35
End: 2020-01-27

## 2020-01-27 VITALS — BODY MASS INDEX: 35.84 KG/M2 | HEIGHT: 66 IN | WEIGHT: 223 LBS

## 2020-01-27 DIAGNOSIS — M25.552 PAIN OF LEFT HIP JOINT: Primary | ICD-10-CM

## 2020-01-27 PROCEDURE — 99213 OFFICE O/P EST LOW 20 MIN: CPT | Performed by: ORTHOPAEDIC SURGERY

## 2020-01-27 NOTE — PROGRESS NOTES
NURSING INTAKE COMMENTS: Patient presents with:  Consult: C/o on/off left hip pain for 3-4 months that has gradually gotten worse. Recalls no injuries. Denies any numbness or tingling. Xray left hip completed on 1/13/20.   Has gone to two PT sessions wit daily with meals. 60 tablet 0   • Lisdexamfetamine Dimesylate (VYVANSE) 40 MG Oral Cap Take 1 capsule (40 mg total) by mouth every morning.  30 capsule 0     No Known Allergies  Family History   Problem Relation Age of Onset   • Heart Disease Mother 39 Views, Left (cpt=73502)    Result Date: 1/13/2020  PROCEDURE: XR HIP W OR WO PELVIS 2 OR 3 VIEWS, LEFT (CPT=73502)  COMPARISON: None. INDICATIONS: Intermittent Lt hip pain for 2 months. No known injury. TECHNIQUE: 3 views were obtained.    FINDINGS:  BON further treatment. I advised her to continue with physical therapy as well as the naproxen. The above note was creating using Dragon speech recognition technology. Please excuse any typos.     This note was scribed by Concetta Morrison PA-C for Dr. James pires

## 2020-01-28 ENCOUNTER — OFFICE VISIT (OUTPATIENT)
Dept: PHYSICAL THERAPY | Age: 35
End: 2020-01-28
Attending: FAMILY MEDICINE
Payer: COMMERCIAL

## 2020-01-28 PROCEDURE — 97140 MANUAL THERAPY 1/> REGIONS: CPT

## 2020-01-28 NOTE — PROGRESS NOTES
Dx:  Left inguinal pain (R10.32)        Insurance (Authorized # of Visits):  [address] exp 4/17/20   POC visits: 6  Authorizing Physician: Dr. Homa Zarate MD visit: none scheduled  Fall Risk: standard         Precautions:       FOTO initial: status- 62 release ant hip     Charges: MTx3       Total Timed Treatment: 40 min  Total Treatment Time: 40 min

## 2020-01-30 ENCOUNTER — OFFICE VISIT (OUTPATIENT)
Dept: PHYSICAL THERAPY | Age: 35
End: 2020-01-30
Attending: FAMILY MEDICINE
Payer: COMMERCIAL

## 2020-01-30 PROCEDURE — 97110 THERAPEUTIC EXERCISES: CPT

## 2020-01-30 PROCEDURE — 97140 MANUAL THERAPY 1/> REGIONS: CPT

## 2020-01-30 NOTE — PROGRESS NOTES
Dx:  Left inguinal pain (R10.32)        Insurance (Authorized # of Visits):  [address] exp 4/17/20   POC visits: 6  Authorizing Physician: Dr. Adela Parikh  Next MD visit: none scheduled  Fall Risk: standard         Precautions:       FOTO initial: status- 62 therapy:  Supine MFR, lacrosse ball L hip flexor area, TFL  Prone self trigger point release L w/lacrosse ball      HEP: 1/21/20-sidelying clamshells, hooklying hip abd green TB, bridges           1/23/20- blue TB for hooklying hip abd            1/28/20-

## 2020-02-02 ENCOUNTER — HOSPITAL ENCOUNTER (OUTPATIENT)
Dept: MRI IMAGING | Age: 35
Discharge: HOME | End: 2020-02-02
Attending: PHYSICIAN ASSISTANT
Payer: COMMERCIAL

## 2020-02-02 DIAGNOSIS — M25.552 PAIN OF LEFT HIP JOINT: ICD-10-CM

## 2020-02-02 PROCEDURE — 73721 MRI JNT OF LWR EXTRE W/O DYE: CPT | Performed by: PHYSICIAN ASSISTANT

## 2020-02-03 ENCOUNTER — PATIENT MESSAGE (OUTPATIENT)
Dept: FAMILY MEDICINE CLINIC | Facility: CLINIC | Age: 35
End: 2020-02-03

## 2020-02-03 DIAGNOSIS — R10.32 LEFT INGUINAL PAIN: Primary | ICD-10-CM

## 2020-02-04 ENCOUNTER — APPOINTMENT (OUTPATIENT)
Dept: PHYSICAL THERAPY | Age: 35
End: 2020-02-04
Attending: FAMILY MEDICINE
Payer: COMMERCIAL

## 2020-02-04 NOTE — TELEPHONE ENCOUNTER
From: Malik Friedman  To: Eli Burgess. Homa Baker DO  Sent: 2/3/2020 8:04 PM CST  Subject: Referral Request    Hi Dr. Homa Baker. I had to make a follow up appt with the orthopedic dr, so I was wondering if you could submit another referral for me?  He had me d

## 2020-02-05 NOTE — TELEPHONE ENCOUNTER
Spoke with patient ( verified) and relayed MRI conclusion below per her request--patient verbalizes understanding and agreement. She will keep her ortho appt as scheduled. No further questions/concerns at this time.    =====  CONCLUSION:   1.  Small ante

## 2020-02-06 ENCOUNTER — APPOINTMENT (OUTPATIENT)
Dept: PHYSICAL THERAPY | Age: 35
End: 2020-02-06
Attending: FAMILY MEDICINE
Payer: COMMERCIAL

## 2020-02-07 ENCOUNTER — OFFICE VISIT (OUTPATIENT)
Dept: ORTHOPEDICS CLINIC | Facility: CLINIC | Age: 35
End: 2020-02-07

## 2020-02-07 VITALS — WEIGHT: 220 LBS | BODY MASS INDEX: 35.36 KG/M2 | HEIGHT: 66 IN

## 2020-02-07 DIAGNOSIS — S73.192D TEAR OF LEFT ACETABULAR LABRUM, SUBSEQUENT ENCOUNTER: Primary | ICD-10-CM

## 2020-02-07 PROCEDURE — 99213 OFFICE O/P EST LOW 20 MIN: CPT | Performed by: ORTHOPAEDIC SURGERY

## 2020-02-07 NOTE — PROGRESS NOTES
NURSING INTAKE COMMENTS: Patient presents with:  Results: MRI left hip      HPI: This 37year old female presents today for follow-up of her MRI scan. She still has some pain in the left hip.   Physical therapy has been somewhat helpful as as has the dasha History      Not on file    Tobacco Use      Smoking status: Never Smoker      Smokeless tobacco: Never Used    Substance and Sexual Activity      Alcohol use: Yes        Comment: socially      Drug use: No      Sexual activity: Not on file        Comment: No avascular necrosis, fractures or other abnormalities. BURSA:  No trochanteric or iliopsoas bursitis. SOFT TISSUES:  Muscles, tendons, and other extra-articular soft tissue surrounding the hip are normal.  OTHER:  Negative. CONCLUSION:  1.  Smal all orders for this visit:    Tear of left acetabular labrum, subsequent encounter  -     XR ASPIR/INJ MAJOR JOINT W/FLUORO LT(CPT=77002/97200); Future        Assessment: Her MRI scan shows a small anterior labral tear and left hip.   I told her that this m

## 2020-02-10 ENCOUNTER — OFFICE VISIT (OUTPATIENT)
Dept: SURGERY | Facility: CLINIC | Age: 35
End: 2020-02-10

## 2020-02-10 VITALS
DIASTOLIC BLOOD PRESSURE: 86 MMHG | HEIGHT: 66 IN | SYSTOLIC BLOOD PRESSURE: 135 MMHG | HEART RATE: 93 BPM | WEIGHT: 225.19 LBS | BODY MASS INDEX: 36.19 KG/M2

## 2020-02-10 DIAGNOSIS — Z51.81 ENCOUNTER FOR THERAPEUTIC DRUG MONITORING: ICD-10-CM

## 2020-02-10 DIAGNOSIS — E66.9 OBESITY (BMI 30-39.9): ICD-10-CM

## 2020-02-10 DIAGNOSIS — F43.9 STRESS: ICD-10-CM

## 2020-02-10 DIAGNOSIS — E78.2 HYPERCHOLESTEROLEMIA WITH HYPERTRIGLYCERIDEMIA: Primary | ICD-10-CM

## 2020-02-10 PROCEDURE — 99214 OFFICE O/P EST MOD 30 MIN: CPT | Performed by: INTERNAL MEDICINE

## 2020-02-10 RX ORDER — ESCITALOPRAM OXALATE 10 MG/1
10 TABLET ORAL DAILY
Qty: 90 TABLET | Refills: 2 | Status: SHIPPED | OUTPATIENT
Start: 2020-02-10 | End: 2020-10-20

## 2020-02-10 RX ORDER — TOPIRAMATE 25 MG/1
25 TABLET ORAL EVERY EVENING
Qty: 30 TABLET | Refills: 2 | Status: SHIPPED | OUTPATIENT
Start: 2020-02-10 | End: 2020-08-12

## 2020-02-10 NOTE — PROGRESS NOTES
[address]  Dept: [phone]       Patient:  Malik Friedman  :      1985  MRN:      [id number]    Chief Complaint:  Patient presents tablet 0     Allergies:  Patient has no known allergies.      Social History:  Social History    Socioeconomic History      Marital status:       Spouse name: Not on file      Number of children: Not on file      Years of education: Not on file Self-Exams: Not Asked        Grew up on a farm: Not Asked        History of tanning: Not Asked        Outdoor occupation: Not Asked        Pt has a pacemaker: No        Pt has a defibrillator: No        Breast feeding: Not Asked        Reaction to local an minutes before or after meals, Utlize portion control strategies to reduce calorie intake, Identify triggers for eating and manage cues and Eat slowly and take 20 to 30 minutes to complete each meal    Exercise Goals Reviewed and Discussed        ROS:    GRETA medication. Liver function stable.     Lab Results   Component Value Date/Time    CHOLEST 213 (H) 07/16/2019 09:12 AM     (H) 07/16/2019 09:12 AM    HDL 39 (L) 07/16/2019 09:12 AM    TRIG 333 (H) 07/16/2019 09:12 AM    VLDL 67 (H) 07/16/2019 09:12 AM

## 2020-02-11 ENCOUNTER — OFFICE VISIT (OUTPATIENT)
Dept: PHYSICAL THERAPY | Age: 35
End: 2020-02-11
Attending: FAMILY MEDICINE
Payer: COMMERCIAL

## 2020-02-11 PROCEDURE — 97140 MANUAL THERAPY 1/> REGIONS: CPT

## 2020-02-11 PROCEDURE — 97110 THERAPEUTIC EXERCISES: CPT

## 2020-02-11 NOTE — PROGRESS NOTES
Dx:  Left inguinal pain (R10.32)        Insurance (Authorized # of Visits):  [address] exp 4/17/20   POC visits: 6  Authorizing Physician: Dr. Iveth Morataya  Next MD visit: none scheduled  Fall Risk: standard         Precautions:       FOTO initial: status- 62 20ftx2  Monster walk yellow TB 20ftx2  Standing hip abd 15x B  Standing hip ext 15x B  Half kneeling hip flexor stretch 20 secx5 L  Hip swings 07pqnf3 L     There ex:  nustep L5x 9 min   Half kneeling hip flexor stretch 01bazq8 L     Manual therapy:  Supin

## 2020-02-26 ENCOUNTER — HOSPITAL ENCOUNTER (OUTPATIENT)
Dept: GENERAL RADIOLOGY | Facility: HOSPITAL | Age: 35
Discharge: HOME | End: 2020-02-26
Attending: ORTHOPAEDIC SURGERY
Payer: COMMERCIAL

## 2020-02-26 DIAGNOSIS — S73.192D TEAR OF LEFT ACETABULAR LABRUM, SUBSEQUENT ENCOUNTER: ICD-10-CM

## 2020-02-26 PROCEDURE — 77002 NEEDLE LOCALIZATION BY XRAY: CPT | Performed by: ORTHOPAEDIC SURGERY

## 2020-02-26 PROCEDURE — 20610 DRAIN/INJ JOINT/BURSA W/O US: CPT | Performed by: ORTHOPAEDIC SURGERY

## 2020-02-26 RX ORDER — LIDOCAINE HYDROCHLORIDE 20 MG/ML
INJECTION, SOLUTION EPIDURAL; INFILTRATION; INTRACAUDAL; PERINEURAL
Status: COMPLETED
Start: 2020-02-26 | End: 2020-02-26

## 2020-02-26 RX ORDER — BUPIVACAINE HYDROCHLORIDE 5 MG/ML
INJECTION, SOLUTION EPIDURAL; INTRACAUDAL
Status: COMPLETED
Start: 2020-02-26 | End: 2020-02-26

## 2020-02-26 RX ORDER — LIDOCAINE HYDROCHLORIDE 20 MG/ML
5 INJECTION, SOLUTION EPIDURAL; INFILTRATION; INTRACAUDAL; PERINEURAL ONCE
Status: COMPLETED | OUTPATIENT
Start: 2020-02-26 | End: 2020-02-26

## 2020-02-26 RX ORDER — BUPIVACAINE HYDROCHLORIDE 5 MG/ML
4 INJECTION, SOLUTION EPIDURAL; INTRACAUDAL ONCE
Status: COMPLETED | OUTPATIENT
Start: 2020-02-26 | End: 2020-02-26

## 2020-02-26 RX ORDER — TRIAMCINOLONE ACETONIDE 40 MG/ML
INJECTION, SUSPENSION INTRA-ARTICULAR; INTRAMUSCULAR
Status: COMPLETED
Start: 2020-02-26 | End: 2020-02-26

## 2020-02-26 RX ORDER — TRIAMCINOLONE ACETONIDE 40 MG/ML
40 INJECTION, SUSPENSION INTRA-ARTICULAR; INTRAMUSCULAR ONCE
Status: COMPLETED | OUTPATIENT
Start: 2020-02-26 | End: 2020-02-26

## 2020-02-26 RX ADMIN — LIDOCAINE HYDROCHLORIDE 5 ML: 20 INJECTION, SOLUTION EPIDURAL; INFILTRATION; INTRACAUDAL; PERINEURAL at 11:15:00

## 2020-02-26 RX ADMIN — TRIAMCINOLONE ACETONIDE 40 MG: 40 INJECTION, SUSPENSION INTRA-ARTICULAR; INTRAMUSCULAR at 11:15:00

## 2020-02-26 RX ADMIN — BUPIVACAINE HYDROCHLORIDE 4 ML: 5 INJECTION, SOLUTION EPIDURAL; INTRACAUDAL at 11:15:00

## 2020-03-06 ENCOUNTER — HOSPITAL ENCOUNTER (OUTPATIENT)
Age: 35
Discharge: HOME | End: 2020-03-06
Attending: EMERGENCY MEDICINE
Payer: COMMERCIAL

## 2020-03-06 VITALS
TEMPERATURE: 98 F | WEIGHT: 215 LBS | RESPIRATION RATE: 16 BRPM | DIASTOLIC BLOOD PRESSURE: 71 MMHG | OXYGEN SATURATION: 98 % | HEART RATE: 101 BPM | BODY MASS INDEX: 34.55 KG/M2 | HEIGHT: 66 IN | SYSTOLIC BLOOD PRESSURE: 122 MMHG

## 2020-03-06 DIAGNOSIS — J02.0 STREPTOCOCCAL SORE THROAT: Primary | ICD-10-CM

## 2020-03-06 LAB — S PYO AG THROAT QL: POSITIVE

## 2020-03-06 PROCEDURE — 99213 OFFICE O/P EST LOW 20 MIN: CPT

## 2020-03-06 PROCEDURE — 99214 OFFICE O/P EST MOD 30 MIN: CPT

## 2020-03-06 PROCEDURE — 87430 STREP A AG IA: CPT

## 2020-03-06 RX ORDER — AMOXICILLIN 875 MG/1
875 TABLET, COATED ORAL 2 TIMES DAILY
Qty: 20 TABLET | Refills: 0 | Status: SHIPPED | OUTPATIENT
Start: 2020-03-06 | End: 2020-03-16

## 2020-03-06 NOTE — ED INITIAL ASSESSMENT (HPI)
Reports 2 day hx of cough, sore throat, sinus pain and pressure. Taking dayquil and nyquil at home without significant relief in symptoms. Denies fevers.

## 2020-03-06 NOTE — ED PROVIDER NOTES
Patient Seen in: 5 [facility] [place]      History   Patient presents with:  Sinusitis    Stated Complaint: Sore throat chest congestion    HPI    The patient is a 29-year-old female with past history of hypercholesterolemia who pr /71   Pulse 101   Resp 16   Temp 98.3 °F (36.8 °C)   Temp src Oral   SpO2 98 %   O2 Device None (Room air)       Current:/71   Pulse 101   Temp 98.3 °F (36.8 °C) (Oral)   Resp 16   Ht 167.6 cm (5' 6\")   Wt 97.5 kg   SpO2 98%   BMI 34.70 kg/m

## 2020-03-09 ENCOUNTER — OFFICE VISIT (OUTPATIENT)
Dept: FAMILY MEDICINE CLINIC | Facility: CLINIC | Age: 35
End: 2020-03-09

## 2020-03-09 VITALS
TEMPERATURE: 98 F | HEART RATE: 91 BPM | SYSTOLIC BLOOD PRESSURE: 110 MMHG | BODY MASS INDEX: 34.87 KG/M2 | DIASTOLIC BLOOD PRESSURE: 70 MMHG | WEIGHT: 217 LBS | HEIGHT: 66 IN

## 2020-03-09 DIAGNOSIS — R68.89 FLU-LIKE SYMPTOMS: Primary | ICD-10-CM

## 2020-03-09 PROCEDURE — 94640 AIRWAY INHALATION TREATMENT: CPT | Performed by: FAMILY MEDICINE

## 2020-03-09 PROCEDURE — 99213 OFFICE O/P EST LOW 20 MIN: CPT | Performed by: FAMILY MEDICINE

## 2020-03-09 RX ORDER — PROMETHAZINE HYDROCHLORIDE AND CODEINE PHOSPHATE 6.25; 1 MG/5ML; MG/5ML
5 SYRUP ORAL EVERY 6 HOURS PRN
Qty: 180 ML | Refills: 0 | Status: SHIPPED | OUTPATIENT
Start: 2020-03-09 | End: 2020-08-12

## 2020-03-09 RX ORDER — ALBUTEROL SULFATE 2.5 MG/3ML
2.5 SOLUTION RESPIRATORY (INHALATION) ONCE
Status: COMPLETED | OUTPATIENT
Start: 2020-03-09 | End: 2020-03-09

## 2020-03-09 RX ORDER — ALBUTEROL SULFATE 90 UG/1
2 AEROSOL, METERED RESPIRATORY (INHALATION) EVERY 4 HOURS PRN
Qty: 1 INHALER | Refills: 0 | Status: SHIPPED | OUTPATIENT
Start: 2020-03-09 | End: 2020-08-12

## 2020-03-09 RX ADMIN — ALBUTEROL SULFATE 2.5 MG: 2.5 SOLUTION RESPIRATORY (INHALATION) at 10:14:00

## 2020-03-09 NOTE — PROGRESS NOTES
Tested pos for strep  Still on abx  Now coughing and cough paroxysism  Still on amox.   Pt complains of cough congestion   Had it for 4 days  No fever   No sob   No chest pain   No neck stiffness  No Headaches    Well-hydrated no shortness of breath no appa

## 2020-03-17 NOTE — PROGRESS NOTES
Gay  Pt has attended 5 visits in Physical Therapy. Pt was last seen 2/11/20. Pt cancelled last 2 sessions due to being ill, did not reschedule and did not return. Discharge pt.

## 2020-07-12 ENCOUNTER — HOSPITAL ENCOUNTER (OUTPATIENT)
Age: 35
Discharge: HOME | End: 2020-07-12
Attending: EMERGENCY MEDICINE
Payer: COMMERCIAL

## 2020-07-12 VITALS
RESPIRATION RATE: 18 BRPM | HEART RATE: 74 BPM | SYSTOLIC BLOOD PRESSURE: 141 MMHG | DIASTOLIC BLOOD PRESSURE: 87 MMHG | OXYGEN SATURATION: 97 % | TEMPERATURE: 98 F

## 2020-07-12 DIAGNOSIS — S05.01XA ABRASION OF RIGHT CORNEA, INITIAL ENCOUNTER: Primary | ICD-10-CM

## 2020-07-12 PROCEDURE — 99214 OFFICE O/P EST MOD 30 MIN: CPT

## 2020-07-12 PROCEDURE — 99213 OFFICE O/P EST LOW 20 MIN: CPT

## 2020-07-12 RX ORDER — POLYMYXIN B SULFATE AND TRIMETHOPRIM 1; 10000 MG/ML; [USP'U]/ML
1 SOLUTION OPHTHALMIC 4 TIMES DAILY
Qty: 10 ML | Refills: 0 | Status: SHIPPED | OUTPATIENT
Start: 2020-07-12 | End: 2020-07-17

## 2020-07-12 NOTE — ED PROVIDER NOTES
Patient Seen in: 5 [facility]      History   Patient presents with:  Eye Problem    Stated Complaint: right eye pain, swollen    HPI    47-year-old female presents for evaluation of right eye pain.   Patient reports she was /87   Pulse 74   Resp 18   Temp 98.1 °F (36.7 °C)   Temp src Temporal   SpO2 97 %   O2 Device None (Room air)       Current:/87   Pulse 74   Temp 98.1 °F (36.7 °C) (Temporal)   Resp 18   LMP  (Within Weeks)   SpO2 97%     Right Eye Chart [place] [address]  [phone]    In 2 days  For follow up          Medications Prescribed:  Discharge Medication List as of 7/12/2020 10:19 AM    START taking these medications    Polymyxin B-Trimethoprim 06824-6.1 UNIT/ML-% Ophthalmic Solution  Apply 1 drop to eye

## 2020-07-12 NOTE — ED INITIAL ASSESSMENT (HPI)
\"Flung open\" shower curtain that poked her in right eye yesterday. Upper eyelid swelling and redness. + tearing. Sensitive to light. Does not wear contact lenses.

## 2020-07-13 ENCOUNTER — TELEPHONE (OUTPATIENT)
Dept: OPHTHALMOLOGY | Facility: CLINIC | Age: 35
End: 2020-07-13

## 2020-07-13 NOTE — TELEPHONE ENCOUNTER
Per pt was seen [facility] for a scratched cornea on the right eye. Was advised to follow up with an opthalmologist as soon as possible.  Please advise

## 2020-07-15 ENCOUNTER — TELEPHONE (OUTPATIENT)
Dept: OPHTHALMOLOGY | Facility: CLINIC | Age: 35
End: 2020-07-15

## 2020-07-15 ENCOUNTER — TELEPHONE (OUTPATIENT)
Dept: FAMILY MEDICINE CLINIC | Facility: CLINIC | Age: 35
End: 2020-07-15

## 2020-07-15 ENCOUNTER — OFFICE VISIT (OUTPATIENT)
Dept: OPHTHALMOLOGY | Facility: CLINIC | Age: 35
End: 2020-07-15

## 2020-07-15 DIAGNOSIS — S05.01XA ABRASION OF RIGHT CORNEA, INITIAL ENCOUNTER: Primary | ICD-10-CM

## 2020-07-15 DIAGNOSIS — Z53.21 PATIENT LEFT WITHOUT BEING SEEN: Primary | ICD-10-CM

## 2020-07-15 NOTE — TELEPHONE ENCOUNTER
Patient is requesting a referral for an ophthalmologist, Dr. Zimmerman. Patient was seen at the urgent care on 7/12 for a scratched cornea and was instructed to follow up with an ophthalmologist within 2 days of her urgent care visit.  Patient was seeing

## 2020-07-15 NOTE — TELEPHONE ENCOUNTER
Per pt has already scheduled an appointment with another ophthalmologist, no longer needing appt.  Please advise

## 2020-07-15 NOTE — ASSESSMENT & PLAN NOTE
Patient did not have a referral to see Dr. Nicholas Acuna today, so she was told if she was seen today, she may have to pay out of pocket. She declined to be seen.   She is going to call her insurance and her primary care and get a referral.  We will call her

## 2020-07-15 NOTE — TELEPHONE ENCOUNTER
Richie, patient is requesting a referral to Dr. Jasbir Ventura. Referral has been pended, please advise.

## 2020-07-15 NOTE — PATIENT INSTRUCTIONS
Patient left without being seen  Patient did not have a referral to see Dr. Jacinto Garcia today, so she was told if she was seen today, she may have to pay out of pocket. She declined to be seen.   She is going to call her insurance and her primary care and g

## 2020-07-15 NOTE — ED NOTES
RECEIVED CALL FROM PATIENT, ABRAHAN SHE LOST HER BOTTLE OF EYE DROPS. REQUESTING REFILL. PATIENT HAS SCHEDULED FOLLOW UP WITH OPTHOMOLOGY ON Friday. PRESCRIPTION REFILL CALLED INTO [facility] PER PATIENT REQUEST.

## 2020-07-16 NOTE — TELEPHONE ENCOUNTER
From  Suraj Landeros To  Pearl Jackson and Delivered  7/15/2020 12:18 PM   Last Read in MyChart  7/15/2020 12:30 PM by Ofelia Bentley

## 2020-08-12 ENCOUNTER — OFFICE VISIT (OUTPATIENT)
Dept: FAMILY MEDICINE CLINIC | Facility: CLINIC | Age: 35
End: 2020-08-12

## 2020-08-12 VITALS
DIASTOLIC BLOOD PRESSURE: 76 MMHG | BODY MASS INDEX: 36.77 KG/M2 | TEMPERATURE: 98 F | HEART RATE: 80 BPM | HEIGHT: 66 IN | WEIGHT: 228.81 LBS | SYSTOLIC BLOOD PRESSURE: 111 MMHG

## 2020-08-12 DIAGNOSIS — Z00.00 WELLNESS EXAMINATION: Primary | ICD-10-CM

## 2020-08-12 DIAGNOSIS — S05.01XD ABRASION OF RIGHT CORNEA, SUBSEQUENT ENCOUNTER: ICD-10-CM

## 2020-08-12 DIAGNOSIS — Z13.220 LIPID SCREENING: ICD-10-CM

## 2020-08-12 PROCEDURE — 99395 PREV VISIT EST AGE 18-39: CPT | Performed by: FAMILY MEDICINE

## 2020-08-12 PROCEDURE — 3078F DIAST BP <80 MM HG: CPT | Performed by: FAMILY MEDICINE

## 2020-08-12 PROCEDURE — 3074F SYST BP LT 130 MM HG: CPT | Performed by: FAMILY MEDICINE

## 2020-08-12 PROCEDURE — 3008F BODY MASS INDEX DOCD: CPT | Performed by: FAMILY MEDICINE

## 2020-08-12 NOTE — PROGRESS NOTES
HPI:   Bakari Ponce is a 38year old female who presents for an Annual Health Visit.    Patient has no specific complaints today, she was recently seen due to abrasion of right cornea that was diagnosed in July, she continues being symptomatic, sh Tobacco Use      Smoking status: Never Smoker      Smokeless tobacco: Never Used    Alcohol use: Yes      Comment: socially    Drug use: No    Social History    Patient does not qualify to have social determinant information on file (likely too young). Referral provided per patient request      Patient Instructions       Prevention Guidelines, Women Ages 25 to 44  Screening tests and vaccines are an important part of managing your health.  A screening test is done to find possible disorders or diseases in Gonorrhea Sexually active women at increased risk for infection  At routine exams   Hepatitis C Anyone at increased risk  At routine exams   HIV All women At routine exams3     Obesity All women in this age group  At routine exams   Syphilis Women at incre Pneumococcal conjugate vaccine (PCV13) and pneumococcal polysaccharide vaccine (PPSV23)  Women at increased risk for infection should talk with their healthcare provider  PCV13: 1 dose ages 23 to 72 (protects against 13 types of pneumococcal bacteria)   PP 3 The USPSTF recommends that all people ages 13 to 72 years be screened for HIV and those younger or older people at increased risk.  The CDC recommends that everyone between the ages of 15 and 59 get tested for HIV at least once as part of routine health c

## 2020-08-25 ENCOUNTER — LAB ENCOUNTER (OUTPATIENT)
Dept: LAB | Age: 35
End: 2020-08-25
Attending: FAMILY MEDICINE
Payer: COMMERCIAL

## 2020-08-25 DIAGNOSIS — Z13.220 LIPID SCREENING: ICD-10-CM

## 2020-08-25 DIAGNOSIS — Z00.00 WELLNESS EXAMINATION: ICD-10-CM

## 2020-08-25 LAB
ALBUMIN SERPL-MCNC: 3.5 G/DL (ref 3.4–5)
ALBUMIN/GLOB SERPL: 1 {RATIO} (ref 1–2)
ALP LIVER SERPL-CCNC: 72 U/L (ref 37–98)
ALT SERPL-CCNC: 24 U/L (ref 13–56)
ANION GAP SERPL CALC-SCNC: 7 MMOL/L (ref 0–18)
AST SERPL-CCNC: 10 U/L (ref 15–37)
BILIRUB SERPL-MCNC: 0.5 MG/DL (ref 0.1–2)
BUN BLD-MCNC: 16 MG/DL (ref 7–18)
BUN/CREAT SERPL: 15.4 (ref 10–20)
CALCIUM BLD-MCNC: 8.7 MG/DL (ref 8.5–10.1)
CHLORIDE SERPL-SCNC: 109 MMOL/L (ref 98–112)
CHOLEST SMN-MCNC: 183 MG/DL (ref ?–200)
CO2 SERPL-SCNC: 25 MMOL/L (ref 21–32)
CREAT BLD-MCNC: 1.04 MG/DL (ref 0.55–1.02)
GLOBULIN PLAS-MCNC: 3.4 G/DL (ref 2.8–4.4)
GLUCOSE BLD-MCNC: 88 MG/DL (ref 70–99)
HDLC SERPL-MCNC: 37 MG/DL (ref 40–59)
LDLC SERPL CALC-MCNC: 100 MG/DL (ref ?–100)
M PROTEIN MFR SERPL ELPH: 6.9 G/DL (ref 6.4–8.2)
NONHDLC SERPL-MCNC: 146 MG/DL (ref ?–130)
OSMOLALITY SERPL CALC.SUM OF ELEC: 293 MOSM/KG (ref 275–295)
PATIENT FASTING Y/N/NP: YES
PATIENT FASTING Y/N/NP: YES
POTASSIUM SERPL-SCNC: 3.9 MMOL/L (ref 3.5–5.1)
SODIUM SERPL-SCNC: 141 MMOL/L (ref 136–145)
TRIGL SERPL-MCNC: 230 MG/DL (ref 30–149)
VLDLC SERPL CALC-MCNC: 46 MG/DL (ref 0–30)

## 2020-08-25 PROCEDURE — 80061 LIPID PANEL: CPT

## 2020-08-25 PROCEDURE — 80053 COMPREHEN METABOLIC PANEL: CPT

## 2020-08-25 PROCEDURE — 36415 COLL VENOUS BLD VENIPUNCTURE: CPT

## 2020-09-21 ENCOUNTER — NURSE TRIAGE (OUTPATIENT)
Dept: FAMILY MEDICINE CLINIC | Facility: CLINIC | Age: 35
End: 2020-09-21

## 2020-09-21 ENCOUNTER — VIRTUAL PHONE E/M (OUTPATIENT)
Dept: FAMILY MEDICINE CLINIC | Facility: CLINIC | Age: 35
End: 2020-09-21

## 2020-09-21 DIAGNOSIS — J01.91 ACUTE RECURRENT SINUSITIS, UNSPECIFIED LOCATION: Primary | ICD-10-CM

## 2020-09-21 DIAGNOSIS — J01.91 ACUTE RECURRENT SINUSITIS, UNSPECIFIED LOCATION: ICD-10-CM

## 2020-09-21 PROCEDURE — 99441 PHONE E/M BY PHYS 5-10 MIN: CPT | Performed by: FAMILY MEDICINE

## 2020-09-21 RX ORDER — IBUPROFEN 600 MG/1
600 TABLET ORAL EVERY 6 HOURS PRN
Qty: 40 TABLET | Refills: 0 | Status: SHIPPED | OUTPATIENT
Start: 2020-09-21 | End: 2020-10-21

## 2020-09-21 RX ORDER — FLUTICASONE PROPIONATE 50 MCG
SPRAY, SUSPENSION (ML) NASAL
Qty: 48 G | Refills: 0 | OUTPATIENT
Start: 2020-09-21

## 2020-09-21 RX ORDER — FLUTICASONE PROPIONATE 50 MCG
1 SPRAY, SUSPENSION (ML) NASAL DAILY
Qty: 1 BOTTLE | Refills: 0 | Status: SHIPPED | OUTPATIENT
Start: 2020-09-21 | End: 2020-12-16 | Stop reason: ALTCHOICE

## 2020-09-21 RX ORDER — AMOXICILLIN 875 MG/1
875 TABLET, COATED ORAL 2 TIMES DAILY
Qty: 20 TABLET | Refills: 0 | Status: SHIPPED | OUTPATIENT
Start: 2020-09-21 | End: 2020-10-01

## 2020-09-21 NOTE — TELEPHONE ENCOUNTER
Action Requested: Summary for Provider     []  Critical Lab, Recommendations Needed  [] Need Additional Advice  [x]   FYI    []   Need Orders  [] Need Medications Sent to Pharmacy  []  Other     SUMMARY: Patient states she started having symptoms of dry

## 2020-09-21 NOTE — PROGRESS NOTES
Maggi Pradhan verbally consents to a Virtual/Telephone Check-In service on 09/21/20. Duration of Service:  5 minutes    Patient has been referred to the [facility] website at [email or web address] to review the yearly Consent to Treat document.     Jasmeet History    Tobacco Use      Smoking status: Never Smoker      Smokeless tobacco: Never Used    Alcohol use: Yes      Comment: socially    Drug use: No       REVIEW OF SYSTEMS:   Review of Systems   Constitutional: Positive for chills and fatigue.  Negative

## 2020-09-22 ENCOUNTER — APPOINTMENT (OUTPATIENT)
Dept: LAB | Age: 35
End: 2020-09-22
Attending: FAMILY MEDICINE
Payer: COMMERCIAL

## 2020-09-22 DIAGNOSIS — J01.91 ACUTE RECURRENT SINUSITIS, UNSPECIFIED LOCATION: ICD-10-CM

## 2020-09-24 ENCOUNTER — TELEPHONE (OUTPATIENT)
Dept: FAMILY MEDICINE CLINIC | Facility: CLINIC | Age: 35
End: 2020-09-24

## 2020-09-24 LAB — SARS-COV-2 RNA RESP QL NAA+PROBE: NOT DETECTED

## 2020-09-24 NOTE — TELEPHONE ENCOUNTER
Dr Jesus Beckham, please advise on patient's question--can she take the amoxicillin you sent in on 9/21/20? May respond to patient on MyChart. Virtual visit 9/21/20.  She's been using flonase, blowing her nose, nasal congestion is less, but congestion h

## 2020-10-14 ENCOUNTER — IMMUNIZATION (OUTPATIENT)
Dept: FAMILY MEDICINE CLINIC | Facility: CLINIC | Age: 35
End: 2020-10-14

## 2020-10-14 DIAGNOSIS — Z23 NEED FOR VACCINATION: ICD-10-CM

## 2020-10-14 PROCEDURE — 90686 IIV4 VACC NO PRSV 0.5 ML IM: CPT | Performed by: PHYSICIAN ASSISTANT

## 2020-10-14 PROCEDURE — 90471 IMMUNIZATION ADMIN: CPT | Performed by: PHYSICIAN ASSISTANT

## 2020-10-20 DIAGNOSIS — F43.9 STRESS: ICD-10-CM

## 2020-10-20 RX ORDER — ESCITALOPRAM OXALATE 10 MG/1
10 TABLET ORAL DAILY
Qty: 30 TABLET | Refills: 0 | Status: SHIPPED | OUTPATIENT
Start: 2020-10-20 | End: 2020-12-16

## 2020-11-27 DIAGNOSIS — F43.9 STRESS: ICD-10-CM

## 2020-11-30 RX ORDER — ESCITALOPRAM OXALATE 10 MG/1
TABLET ORAL
Qty: 30 TABLET | Refills: 0 | OUTPATIENT
Start: 2020-11-30

## 2020-12-14 ENCOUNTER — TELEPHONE (OUTPATIENT)
Dept: FAMILY MEDICINE CLINIC | Facility: CLINIC | Age: 35
End: 2020-12-14

## 2020-12-14 NOTE — TELEPHONE ENCOUNTER
Left message. Patient to call for covid screening questions. Also can keep appt as scheduled unless symptoms severe, patient to call office.

## 2020-12-16 PROBLEM — F41.8 DEPRESSION WITH ANXIETY: Status: ACTIVE | Noted: 2020-12-16

## 2020-12-16 NOTE — PROGRESS NOTES
Angelo Citizen is a 38year old female.   HPI:   And is here to discuss Lexapro, patient was a starting medication last year after she developed increase anxiety with medication for weight loss and she has been on medication ever since, patient was doing kg)   LMP 12/01/2020   BMI 38.58 kg/m²     Physical Exam  Constitutional:       General: She is not in acute distress.   Pulmonary:      Effort: Pulmonary effort is normal.   Neurological:      Mental Status: She is alert and oriented to person, place, and

## 2020-12-16 NOTE — PATIENT INSTRUCTIONS
Behavioral Health Assessment and Treatment    SETTING UP A BEHAVIORAL HEALTH ASSESSMENT    To schedule an assessment at any of our below locations call:   [phone]. Emergency walk-in assessments are available 24/7 at our [facility].

## 2021-03-01 ENCOUNTER — LAB ENCOUNTER (OUTPATIENT)
Dept: LAB | Age: 36
End: 2021-03-01
Attending: FAMILY MEDICINE
Payer: COMMERCIAL

## 2021-03-01 ENCOUNTER — OFFICE VISIT (OUTPATIENT)
Dept: FAMILY MEDICINE CLINIC | Facility: CLINIC | Age: 36
End: 2021-03-01

## 2021-03-01 VITALS
SYSTOLIC BLOOD PRESSURE: 119 MMHG | HEIGHT: 66 IN | HEART RATE: 70 BPM | BODY MASS INDEX: 37.78 KG/M2 | TEMPERATURE: 98 F | RESPIRATION RATE: 16 BRPM | WEIGHT: 235.06 LBS | DIASTOLIC BLOOD PRESSURE: 75 MMHG

## 2021-03-01 DIAGNOSIS — N94.6 DYSMENORRHEA: Primary | ICD-10-CM

## 2021-03-01 DIAGNOSIS — N94.6 DYSMENORRHEA: ICD-10-CM

## 2021-03-01 LAB
FSH SERPL-ACNC: 5.4 MIU/ML
LH SERPL-ACNC: 3.9 MIU/ML
TSI SER-ACNC: 2.29 MIU/ML (ref 0.36–3.74)

## 2021-03-01 PROCEDURE — 84403 ASSAY OF TOTAL TESTOSTERONE: CPT

## 2021-03-01 PROCEDURE — 84402 ASSAY OF FREE TESTOSTERONE: CPT

## 2021-03-01 PROCEDURE — 36415 COLL VENOUS BLD VENIPUNCTURE: CPT

## 2021-03-01 PROCEDURE — 83002 ASSAY OF GONADOTROPIN (LH): CPT

## 2021-03-01 PROCEDURE — 3008F BODY MASS INDEX DOCD: CPT | Performed by: FAMILY MEDICINE

## 2021-03-01 PROCEDURE — 3078F DIAST BP <80 MM HG: CPT | Performed by: FAMILY MEDICINE

## 2021-03-01 PROCEDURE — 3074F SYST BP LT 130 MM HG: CPT | Performed by: FAMILY MEDICINE

## 2021-03-01 PROCEDURE — 83001 ASSAY OF GONADOTROPIN (FSH): CPT

## 2021-03-01 PROCEDURE — 99214 OFFICE O/P EST MOD 30 MIN: CPT | Performed by: FAMILY MEDICINE

## 2021-03-01 PROCEDURE — 84443 ASSAY THYROID STIM HORMONE: CPT

## 2021-03-01 NOTE — PROGRESS NOTES
Malik Friedman is a 38year old female.   HPI:   Patient has a long history of dysmenorrhea, she has used multiple oral contraceptive pills with poor response, she has noted that over the years dysmenorrhea is progressively getting worse, she states that Physical Exam  Constitutional:       General: She is not in acute distress. Pulmonary:      Effort: Pulmonary effort is normal.   Abdominal:      General: There is no distension. Tenderness: There is no abdominal tenderness.    Neurological:

## 2021-03-04 LAB
TESTOSTERONE, FREE, S: 0.5 NG/DL
TESTOSTERONE, TOTAL, S: 16 NG/DL

## 2021-04-09 DIAGNOSIS — F41.8 DEPRESSION WITH ANXIETY: ICD-10-CM

## 2021-04-10 RX ORDER — ESCITALOPRAM OXALATE 10 MG/1
TABLET ORAL
Qty: 90 TABLET | Refills: 0 | Status: SHIPPED | OUTPATIENT
Start: 2021-04-10 | End: 2021-07-14

## 2021-04-15 ENCOUNTER — OFFICE VISIT (OUTPATIENT)
Dept: OBGYN CLINIC | Facility: CLINIC | Age: 36
End: 2021-04-15

## 2021-04-15 VITALS
SYSTOLIC BLOOD PRESSURE: 136 MMHG | HEART RATE: 70 BPM | BODY MASS INDEX: 37 KG/M2 | WEIGHT: 230.81 LBS | DIASTOLIC BLOOD PRESSURE: 83 MMHG

## 2021-04-15 DIAGNOSIS — Z01.419 ENCOUNTER FOR GYNECOLOGICAL EXAMINATION WITHOUT ABNORMAL FINDING: Primary | ICD-10-CM

## 2021-04-15 DIAGNOSIS — Z12.4 SCREENING FOR MALIGNANT NEOPLASM OF CERVIX: ICD-10-CM

## 2021-04-15 PROCEDURE — 3075F SYST BP GE 130 - 139MM HG: CPT | Performed by: OBSTETRICS & GYNECOLOGY

## 2021-04-15 PROCEDURE — 3079F DIAST BP 80-89 MM HG: CPT | Performed by: OBSTETRICS & GYNECOLOGY

## 2021-04-15 PROCEDURE — 99385 PREV VISIT NEW AGE 18-39: CPT | Performed by: OBSTETRICS & GYNECOLOGY

## 2021-06-10 ENCOUNTER — APPOINTMENT (OUTPATIENT)
Dept: CT IMAGING | Facility: HOSPITAL | Age: 36
End: 2021-06-10
Attending: EMERGENCY MEDICINE
Payer: COMMERCIAL

## 2021-06-10 ENCOUNTER — HOSPITAL ENCOUNTER (EMERGENCY)
Facility: HOSPITAL | Age: 36
Discharge: HOME | End: 2021-06-10
Attending: EMERGENCY MEDICINE
Payer: COMMERCIAL

## 2021-06-10 VITALS
OXYGEN SATURATION: 98 % | DIASTOLIC BLOOD PRESSURE: 80 MMHG | RESPIRATION RATE: 18 BRPM | BODY MASS INDEX: 36.16 KG/M2 | HEART RATE: 74 BPM | SYSTOLIC BLOOD PRESSURE: 120 MMHG | HEIGHT: 66 IN | WEIGHT: 225 LBS | TEMPERATURE: 98 F

## 2021-06-10 DIAGNOSIS — K57.92 ACUTE DIVERTICULITIS: Primary | ICD-10-CM

## 2021-06-10 PROCEDURE — 74177 CT ABD & PELVIS W/CONTRAST: CPT | Performed by: EMERGENCY MEDICINE

## 2021-06-10 PROCEDURE — 99284 EMERGENCY DEPT VISIT MOD MDM: CPT

## 2021-06-10 PROCEDURE — 36415 COLL VENOUS BLD VENIPUNCTURE: CPT

## 2021-06-10 PROCEDURE — 81001 URINALYSIS AUTO W/SCOPE: CPT | Performed by: EMERGENCY MEDICINE

## 2021-06-10 PROCEDURE — 81025 URINE PREGNANCY TEST: CPT

## 2021-06-10 PROCEDURE — 85025 COMPLETE CBC W/AUTO DIFF WBC: CPT | Performed by: EMERGENCY MEDICINE

## 2021-06-10 PROCEDURE — 80048 BASIC METABOLIC PNL TOTAL CA: CPT | Performed by: EMERGENCY MEDICINE

## 2021-06-10 RX ORDER — CIPROFLOXACIN 500 MG/1
500 TABLET, FILM COATED ORAL 2 TIMES DAILY
Qty: 14 TABLET | Refills: 0 | Status: SHIPPED | OUTPATIENT
Start: 2021-06-10 | End: 2021-06-17

## 2021-06-10 RX ORDER — METRONIDAZOLE 500 MG/1
500 TABLET ORAL 3 TIMES DAILY
Qty: 30 TABLET | Refills: 0 | Status: SHIPPED | OUTPATIENT
Start: 2021-06-10 | End: 2021-06-20

## 2021-06-10 NOTE — ED INITIAL ASSESSMENT (HPI)
Left sided lower abdominal pains/flank pains since yesterday.   + nausea    Hx of ovarian cysts and diverticulitis

## 2021-06-10 NOTE — ED PROVIDER NOTES
Patient Seen in: [facility]      History   Patient presents with:  Abdominal Pain    Stated Complaint: left flank pain     HPI/Subjective:   HPI    Patient is a 47-year-old female who presents with 2 days of left-sided abdomin reviewed and negative except as noted above.     Physical Exam     ED Triage Vitals [06/10/21 1012]   /82   Pulse 78   Resp 18   Temp 98.2 °F (36.8 °C)   Temp src Oral   SpO2 99 %   O2 Device None (Room air)       Current:/82   Pulse 78   Temp 9 LIGHT GREEN   RAINBOW DRAW GOLD   RAINBOW DRAW BLUE         MDM       Pulse Ox: 99%, Normal, RA    Cardiac Monitor: Pulse Readings from Last 1 Encounters:  06/10/21 : 78  , sinus     Radiology findings: CT ABDOMEN+PELVIS(CONTRAST ONLY)(CPT=74177)    Result tablet Refills: 0

## 2021-07-13 DIAGNOSIS — F41.8 DEPRESSION WITH ANXIETY: ICD-10-CM

## 2021-07-14 RX ORDER — ESCITALOPRAM OXALATE 10 MG/1
TABLET ORAL
Qty: 90 TABLET | Refills: 0 | Status: SHIPPED | OUTPATIENT
Start: 2021-07-14 | End: 2021-10-18

## 2021-09-09 ENCOUNTER — VIRTUAL PHONE E/M (OUTPATIENT)
Dept: FAMILY MEDICINE CLINIC | Facility: CLINIC | Age: 36
End: 2021-09-09

## 2021-09-09 ENCOUNTER — HOSPITAL ENCOUNTER (OUTPATIENT)
Dept: GENERAL RADIOLOGY | Age: 36
Discharge: HOME | End: 2021-09-09
Attending: PHYSICIAN ASSISTANT
Payer: COMMERCIAL

## 2021-09-09 DIAGNOSIS — R09.81 NASAL CONGESTION: ICD-10-CM

## 2021-09-09 DIAGNOSIS — R05.9 COUGH: ICD-10-CM

## 2021-09-09 DIAGNOSIS — R05.9 COUGH: Primary | ICD-10-CM

## 2021-09-09 PROCEDURE — 71046 X-RAY EXAM CHEST 2 VIEWS: CPT | Performed by: PHYSICIAN ASSISTANT

## 2021-09-09 PROCEDURE — 99441 PHONE E/M BY PHYS 5-10 MIN: CPT | Performed by: PHYSICIAN ASSISTANT

## 2021-09-09 RX ORDER — BENZONATATE 200 MG/1
200 CAPSULE ORAL 3 TIMES DAILY PRN
Qty: 30 CAPSULE | Refills: 0 | Status: SHIPPED | OUTPATIENT
Start: 2021-09-09 | End: 2021-10-05

## 2021-09-09 RX ORDER — FLUTICASONE PROPIONATE 50 MCG
SPRAY, SUSPENSION (ML) NASAL
Qty: 48 G | Refills: 0 | OUTPATIENT
Start: 2021-09-09

## 2021-09-09 RX ORDER — FLUTICASONE PROPIONATE 50 MCG
2 SPRAY, SUSPENSION (ML) NASAL DAILY
Qty: 1 EACH | Refills: 1 | Status: SHIPPED | OUTPATIENT
Start: 2021-09-09 | End: 2021-10-05

## 2021-09-09 NOTE — PROGRESS NOTES
HPI: HPI  Telehealth outside of [address] Verbal Consent   The patient was made aware of the limitations of the telehealth visit, including treatment limitations as no physical exam could be performed.   The patient was advised to call 911 or Pyridoxine HCl (VITAMIN B-6 OR) Take by mouth daily. • VITAMIN D OR Take by mouth daily. • Omega-3 Fatty Acids (FISH OIL OR) Take by mouth daily. • ZINC ACETATE OR Take by mouth daily. • Cyanocobalamin (VITAMIN B-12 OR) Take by mouth daily. status:       Spouse name: Not on file      Number of children: Not on file      Years of education: Not on file      Highest education level: Not on file    Occupational History      Not on file    Tobacco Use      Smoking status: Never Smoker or Organizations:       Attends Club or Organization Meetings:       Marital Status:   Intimate Partner Violence:       Fear of Current or Ex-Partner:       Emotionally Abused:       Physically Abused:       Sexually Abused:     Review of Systems:   Review

## 2021-10-05 ENCOUNTER — PATIENT MESSAGE (OUTPATIENT)
Dept: FAMILY MEDICINE CLINIC | Facility: CLINIC | Age: 36
End: 2021-10-05

## 2021-10-05 ENCOUNTER — TELEMEDICINE (OUTPATIENT)
Dept: FAMILY MEDICINE CLINIC | Facility: CLINIC | Age: 36
End: 2021-10-05

## 2021-10-05 DIAGNOSIS — D22.9 MULTIPLE NEVI: Primary | ICD-10-CM

## 2021-10-05 DIAGNOSIS — E78.2 HYPERCHOLESTEROLEMIA WITH HYPERTRIGLYCERIDEMIA: ICD-10-CM

## 2021-10-05 DIAGNOSIS — Z87.19 HISTORY OF DIVERTICULITIS: ICD-10-CM

## 2021-10-05 DIAGNOSIS — D36.9 TUBULAR ADENOMA: ICD-10-CM

## 2021-10-05 DIAGNOSIS — Z23 NEEDS FLU SHOT: ICD-10-CM

## 2021-10-05 PROCEDURE — 99213 OFFICE O/P EST LOW 20 MIN: CPT | Performed by: FAMILY MEDICINE

## 2021-10-05 NOTE — TELEPHONE ENCOUNTER
From: Silvestre Hughes  To: Car Sewell. Jorge Cuellar MD  Sent: 10/5/2021 9:44 AM CDT  Subject: COVID vaccination card    Here is my COVID vaccination card. Let me know if you need anything else.     Thank you,    Caitlyn Gibson

## 2021-10-05 NOTE — PROGRESS NOTES
This visit is conducted using Telemedicine with live, interactive video and audio. Patient has been referred to the [facility] website at [email or web address] to review the yearly Consent to Treat document.     Patient understands and accepts financial res Negative. Gastrointestinal: Negative. Skin: Negative. Neurological: Negative. EXAM:   LMP 05/20/2021     Physical Exam  Constitutional:       General: She is not in acute distress.   Pulmonary:      Effort: Pulmonary effort is normal.   N

## 2021-10-06 ENCOUNTER — TELEPHONE (OUTPATIENT)
Dept: GASTROENTEROLOGY | Facility: CLINIC | Age: 36
End: 2021-10-06

## 2021-10-06 NOTE — TELEPHONE ENCOUNTER
Reviewed patient's chart. Contacted patient and informed her that she is not due for colon recall at this time. Last cln in 9/18/2017 with 5 year recall. Advised patient she is due September 2022 which she understood. Not symptomatic at this time.

## 2021-10-06 NOTE — TELEPHONE ENCOUNTER
Pt called to schedule repeat colonoscopy. She states she received a reminder in My Chart to schedule. Please call.

## 2021-10-16 DIAGNOSIS — F41.8 DEPRESSION WITH ANXIETY: ICD-10-CM

## 2021-10-18 RX ORDER — ESCITALOPRAM OXALATE 10 MG/1
TABLET ORAL
Qty: 90 TABLET | Refills: 0 | Status: SHIPPED | OUTPATIENT
Start: 2021-10-18 | End: 2022-01-14

## 2021-11-01 ENCOUNTER — NURSE ONLY (OUTPATIENT)
Dept: FAMILY MEDICINE CLINIC | Facility: CLINIC | Age: 36
End: 2021-11-01

## 2021-11-01 DIAGNOSIS — Z23 NEED FOR VACCINATION: Primary | ICD-10-CM

## 2021-11-01 PROCEDURE — 90471 IMMUNIZATION ADMIN: CPT | Performed by: FAMILY MEDICINE

## 2021-11-01 PROCEDURE — 90686 IIV4 VACC NO PRSV 0.5 ML IM: CPT | Performed by: FAMILY MEDICINE

## 2021-11-09 ENCOUNTER — PATIENT MESSAGE (OUTPATIENT)
Dept: FAMILY MEDICINE CLINIC | Facility: CLINIC | Age: 36
End: 2021-11-09

## 2021-11-09 NOTE — TELEPHONE ENCOUNTER
From: Salazar Roth  To: Mary Jo Anguiano. Komal Isaac MD  Sent: 11/9/2021 11:49 AM CST  Subject: HVBBZ-13 booster for Elizabeth Darling,    Is [place] scheduling the booster shots for [place]? Thanks!     [facility]

## 2021-11-15 ENCOUNTER — HOSPITAL ENCOUNTER (OUTPATIENT)
Age: 36
Discharge: HOME | End: 2021-11-15
Payer: COMMERCIAL

## 2021-11-15 PROCEDURE — 0031A HC JANSSEN COVID-19 VACCINE ADMIN 1ST DOSE: CPT

## 2022-01-13 DIAGNOSIS — F41.8 DEPRESSION WITH ANXIETY: ICD-10-CM

## 2022-01-14 RX ORDER — ESCITALOPRAM OXALATE 10 MG/1
TABLET ORAL
Qty: 90 TABLET | Refills: 0 | Status: SHIPPED | OUTPATIENT
Start: 2022-01-14

## 2022-02-01 ENCOUNTER — OFFICE VISIT (OUTPATIENT)
Dept: PODIATRY CLINIC | Facility: CLINIC | Age: 37
End: 2022-02-01
Payer: COMMERCIAL

## 2022-02-01 DIAGNOSIS — M72.2 PLANTAR FASCIITIS OF RIGHT FOOT: Primary | ICD-10-CM

## 2022-02-01 DIAGNOSIS — M21.6X1 ACQUIRED EQUINUS DEFORMITY OF RIGHT FOOT: ICD-10-CM

## 2022-02-01 DIAGNOSIS — M79.671 PAIN OF RIGHT HEEL: ICD-10-CM

## 2022-02-01 PROCEDURE — 99203 OFFICE O/P NEW LOW 30 MIN: CPT | Performed by: PODIATRIST

## 2022-03-25 DIAGNOSIS — F41.8 DEPRESSION WITH ANXIETY: ICD-10-CM

## 2022-03-25 RX ORDER — ESCITALOPRAM OXALATE 10 MG/1
10 TABLET ORAL DAILY
Qty: 90 TABLET | Refills: 1 | Status: SHIPPED | OUTPATIENT
Start: 2022-03-25 | End: 2023-02-08

## 2022-03-25 NOTE — TELEPHONE ENCOUNTER
Refill passed per [address] [place] protocol. Requested Prescriptions   Pending Prescriptions Disp Refills    ESCITALOPRAM 10 MG Oral Tab [Pharmacy Med Name: ESCITALOPRAM 10MG TABLETS] 90 tablet 0     Sig: TAKE 1 TABLET(10 MG) BY MOUTH DAILY        Psychiatric Non-Scheduled (Anti-Anxiety) Failed - 3/25/2022  8:01 AM        Failed - Appointment in last 6 or next 3 months             Recent Outpatient Visits              1 month ago Plantar fasciitis of right foot    [address] [facility], [place], [place], [place]    Office Visit    4 months ago Need for vaccination    [address] [facility] [facility] Family Medicine    Nurse Only    5 months ago Multiple nevi    [address] [facility] Glenny Mercado MD    Telemedicine    6 months ago Cough    [address] [facility] Magdalena Chapa PA-C    Virtual Phone E/M    11 months ago Encounter for gynecological examination without abnormal finding    [address], [place], [address] [place] [address],     Office Visit

## 2022-05-17 ENCOUNTER — APPOINTMENT (OUTPATIENT)
Dept: URBAN - METROPOLITAN AREA CLINIC 248 | Age: 37
Setting detail: DERMATOLOGY
End: 2022-06-21

## 2022-05-17 DIAGNOSIS — D18.0 HEMANGIOMA: ICD-10-CM

## 2022-05-17 DIAGNOSIS — L56.4 POLYMORPHOUS LIGHT ERUPTION: ICD-10-CM

## 2022-05-17 DIAGNOSIS — L81.4 OTHER MELANIN HYPERPIGMENTATION: ICD-10-CM

## 2022-05-17 DIAGNOSIS — D22 MELANOCYTIC NEVI: ICD-10-CM

## 2022-05-17 PROBLEM — D22.39 MELANOCYTIC NEVI OF OTHER PARTS OF FACE: Status: ACTIVE | Noted: 2022-05-17

## 2022-05-17 PROBLEM — D18.01 HEMANGIOMA OF SKIN AND SUBCUTANEOUS TISSUE: Status: ACTIVE | Noted: 2022-05-17

## 2022-05-17 PROBLEM — D22.5 MELANOCYTIC NEVI OF TRUNK: Status: ACTIVE | Noted: 2022-05-17

## 2022-05-17 PROCEDURE — OTHER COUNSELING: OTHER

## 2022-05-17 PROCEDURE — OTHER PRESCRIPTION MEDICATION MANAGEMENT: OTHER

## 2022-05-17 PROCEDURE — 99203 OFFICE O/P NEW LOW 30 MIN: CPT

## 2022-05-17 PROCEDURE — OTHER PRESCRIPTION: OTHER

## 2022-05-17 RX ORDER — TRIAMCINOLONE ACETONIDE 1 MG/G
CREAM TOPICAL BID
Qty: 80 | Refills: 3 | Status: ERX | COMMUNITY
Start: 2022-05-17

## 2022-05-17 ASSESSMENT — LOCATION SIMPLE DESCRIPTION DERM
LOCATION SIMPLE: LEFT LOWER BACK
LOCATION SIMPLE: RIGHT CHEEK
LOCATION SIMPLE: LEFT UPPER BACK
LOCATION SIMPLE: ABDOMEN

## 2022-05-17 ASSESSMENT — LOCATION DETAILED DESCRIPTION DERM
LOCATION DETAILED: LEFT SUPERIOR MEDIAL LOWER BACK
LOCATION DETAILED: LEFT MID-UPPER BACK
LOCATION DETAILED: RIGHT INFERIOR PREAURICULAR CHEEK
LOCATION DETAILED: LEFT LATERAL ABDOMEN

## 2022-05-17 ASSESSMENT — LOCATION ZONE DERM
LOCATION ZONE: FACE
LOCATION ZONE: TRUNK

## 2022-05-20 ENCOUNTER — TELEPHONE (OUTPATIENT)
Dept: GASTROENTEROLOGY | Facility: CLINIC | Age: 37
End: 2022-05-20

## 2022-05-20 DIAGNOSIS — Z12.11 SCREEN FOR COLON CANCER: Primary | ICD-10-CM

## 2022-05-20 DIAGNOSIS — Z86.010 HX OF ADENOMATOUS COLONIC POLYPS: ICD-10-CM

## 2022-05-20 NOTE — TELEPHONE ENCOUNTER
Patient outreach message received:    Last cln in 9/18/2017 with 5 year recall. Advised patient she is due September 2022 which she understood    Recall reminder letter mailed out to patient.

## 2022-06-27 RX ORDER — RIBOFLAVIN (VITAMIN B2) 100 MG
100 TABLET ORAL DAILY
COMMUNITY

## 2022-06-27 NOTE — TELEPHONE ENCOUNTER
Ok to schedule colonoscopy with MAC with split golytely for colorectal cancer screening and history of adenomatous colon polyps at [address] [place] [address] or [facility]    Thanks    Óscar Smith MD  [address] [place] - Gastroenterology

## 2022-06-27 NOTE — TELEPHONE ENCOUNTER
Dr. Kayla Coleman    Patient called to schedule 5 year recall colonoscopy. Please provide orders if appropriate. Thank you    Last Procedure, Date, MD:  Colonoscopy Dr. Kayla Coleman 9/18/2017  Last Diagnosis:  Transverse colon polyp, sigmoid diverticulosis  Recalled for (mth/yrs): 5 years  Sedation used previously:  IV  Last Prep Used (if known):  suprep  Quality of prep (if known): good  Anticoagulants: no  Diabetic Meds: no  BP meds(Ace inhibitors/ARB's): no  Weight loss meds (phentermine/vyvanse): no  Iron supplement (RX/OTC): no  Height & Weight/BMI: 5'6\" 220lbs BMI 35.5  Hx of Cardiac/CVA issues/(MI/Stroke): no  Devices Pacemaker/Defibrillator/Stents: no  Resp. Issues/Oxygen Use/WARREN/COPD: no  Issues w/Anesthesia: no    Symptoms (Y/N): no  Symptoms Details: no    Special comments/notes: n/a    Please advise on orders and prep, thank you.

## 2022-07-05 ENCOUNTER — APPOINTMENT (OUTPATIENT)
Dept: URBAN - METROPOLITAN AREA CLINIC 248 | Age: 37
Setting detail: DERMATOLOGY
End: 2022-07-05

## 2022-07-05 PROBLEM — D48.5 NEOPLASM OF UNCERTAIN BEHAVIOR OF SKIN: Status: ACTIVE | Noted: 2022-07-05

## 2022-07-05 PROCEDURE — OTHER BIOPSY BY SHAVE METHOD: OTHER

## 2022-07-05 PROCEDURE — A4550 SURGICAL TRAYS: HCPCS

## 2022-07-05 PROCEDURE — 11102 TANGNTL BX SKIN SINGLE LES: CPT

## 2022-07-05 NOTE — PROCEDURE: BIOPSY BY SHAVE METHOD
Hide Anticipated Plan (Based On Presumed Biopsy Results)?: No
Was A Bandage Applied: Yes
Detail Level: Detailed
Post-Care Instructions: I reviewed with the patient in detail post-care instructions. Patient is to keep the biopsy site dry overnight, and then apply petrolatum twice daily until healed. Patient may wash with soap and water the day following the procedure.
Silver Nitrate Text: The wound bed was treated with silver nitrate after the biopsy was performed.
Information: Selecting Yes will display possible errors in your note based on the variables you have selected. This validation is only offered as a suggestion for you. PLEASE NOTE THAT THE VALIDATION TEXT WILL BE REMOVED WHEN YOU FINALIZE YOUR NOTE. IF YOU WANT TO FAX A PRELIMINARY NOTE YOU WILL NEED TO TOGGLE THIS TO 'NO' IF YOU DO NOT WANT IT IN YOUR FAXED NOTE.
Electrodesiccation And Curettage Text: The wound bed was treated with electrodesiccation and curettage after the biopsy was performed.
X Size Of Lesion In Cm: 0
Billing Type: Third-Party Bill
Curettage Text: The wound bed was treated with curettage after the biopsy was performed.
Anesthesia Volume In Cc (Will Not Render If 0): 1
Dressing: bandage
Anesthesia Type: 0.5% lidocaine with 1:400,000 epinephrine
Type Of Destruction Used: Curettage
Notification Instructions: Patient will be notified of biopsy results. However, patient instructed to call the office if not contacted within 2 weeks.
Wound Care: Petrolatum
Body Location Override (Optional - Billing Will Still Be Based On Selected Body Map Location If Applicable): right medial lower arm
Biopsy Method: double edge Personna blade
Electrodesiccation Text: The wound bed was treated with electrodesiccation after the biopsy was performed.
Cryotherapy Text: The wound bed was treated with cryotherapy after the biopsy was performed.
Hemostasis: Drysol
Biopsy Type: H and E
Size Of Lesion In Cm: 0.5
Depth Of Biopsy: dermis
Consent: Written consent was obtained and risks were reviewed including but not limited to scarring, infection, bleeding, scabbing, incomplete removal, nerve damage and allergy to anesthesia.

## 2022-09-01 ENCOUNTER — OFFICE VISIT (OUTPATIENT)
Dept: FAMILY MEDICINE CLINIC | Facility: CLINIC | Age: 37
End: 2022-09-01
Payer: COMMERCIAL

## 2022-09-01 VITALS
DIASTOLIC BLOOD PRESSURE: 74 MMHG | RESPIRATION RATE: 18 BRPM | WEIGHT: 221.19 LBS | HEIGHT: 66 IN | HEART RATE: 85 BPM | BODY MASS INDEX: 35.55 KG/M2 | SYSTOLIC BLOOD PRESSURE: 121 MMHG

## 2022-09-01 DIAGNOSIS — J00 ACUTE NASOPHARYNGITIS: Primary | ICD-10-CM

## 2022-09-01 DIAGNOSIS — J00 ACUTE NASOPHARYNGITIS: ICD-10-CM

## 2022-09-01 PROCEDURE — 3008F BODY MASS INDEX DOCD: CPT | Performed by: FAMILY MEDICINE

## 2022-09-01 PROCEDURE — 3074F SYST BP LT 130 MM HG: CPT | Performed by: FAMILY MEDICINE

## 2022-09-01 PROCEDURE — 99213 OFFICE O/P EST LOW 20 MIN: CPT | Performed by: FAMILY MEDICINE

## 2022-09-01 PROCEDURE — 3078F DIAST BP <80 MM HG: CPT | Performed by: FAMILY MEDICINE

## 2022-09-01 RX ORDER — ECHINACEA PURPUREA EXTRACT 125 MG
1 TABLET ORAL AS NEEDED
Qty: 60 ML | Refills: 0 | Status: SHIPPED | OUTPATIENT
Start: 2022-09-01

## 2022-09-01 RX ORDER — TRIAMCINOLONE ACETONIDE 1 MG/G
CREAM TOPICAL
COMMUNITY
Start: 2022-05-17

## 2022-09-01 RX ORDER — BENZONATATE 200 MG/1
200 CAPSULE ORAL 3 TIMES DAILY PRN
Qty: 30 CAPSULE | Refills: 0 | Status: SHIPPED | OUTPATIENT
Start: 2022-09-01

## 2022-09-01 RX ORDER — SODIUM CHLORIDE 5 %
OINTMENT (GRAM) OPHTHALMIC (EYE)
COMMUNITY
Start: 2020-08-11 | End: 2022-09-01

## 2022-09-01 RX ORDER — FLUTICASONE PROPIONATE 50 MCG
2 SPRAY, SUSPENSION (ML) NASAL DAILY
Qty: 16 G | Refills: 0 | Status: SHIPPED | OUTPATIENT
Start: 2022-09-01 | End: 2022-09-11

## 2022-09-01 RX ORDER — TESTOSTERONE MICRONIZED 100 %
POWDER (GRAM) MISCELLANEOUS
COMMUNITY
Start: 2022-08-11

## 2022-09-02 RX ORDER — FLUTICASONE PROPIONATE 50 MCG
SPRAY, SUSPENSION (ML) NASAL
Qty: 48 G | Refills: 0 | OUTPATIENT
Start: 2022-09-02

## 2022-09-02 NOTE — TELEPHONE ENCOUNTER
Duplicate request, previously addressed.         fluticasone propionate 50 MCG/ACT Nasal Suspension 16 g 0 9/1/2022 9/11/2022    Sig - Route: 2 sprays by Each Nare route daily for 10 days. - Each Nare    Sent to pharmacy as: Fluticasone Propionate 50 MCG/ACT Nasal Suspension (Flonase)    E-Prescribing Status: Receipt confirmed by pharmacy (9/1/2022  2:18 PM CDT)

## 2022-09-21 ENCOUNTER — TELEPHONE (OUTPATIENT)
Dept: GASTROENTEROLOGY | Facility: CLINIC | Age: 37
End: 2022-09-21

## 2022-09-27 NOTE — TELEPHONE ENCOUNTER
Scheduled for:  Colonoscopy [address]  Provider Name:  Dr. Anthony Arrington  Date:  1/11/2023  Location:  [facility]  Sedation:  MAC  Time:  12:15 pm, (pt is aware that Nadja Gonzalez will call the day before to confirm arrival time)  Prep:  Msg sent to   Meds/Allergies Reconciled?:  Yes  Diagnosis with codes:  Screening for colon cancer Z12.11; Hx of adenomatous colon polyps Z86.010  Was patient informed to call insurance with codes (Y/N):  Yes  Referral sent?:  Yes  [facility]  notified?:  Electronic case request was sent to Nadja Carlos via [employer]. Medication Orders:  N/A  Misc Orders:  N/A     Further instructions given by staff: Informed patient I will send prep instructions once we hear back about which prep. She verbalized understanding.

## 2022-09-27 NOTE — TELEPHONE ENCOUNTER
Dr. Lela Michelle -    Patient stated she has gotten sick from prep in the past. Wondering if she can do Miralax/Gatorade. She denies hx of constipation. Please advise, thank you!     Surgery Schedulers - prep instructions will need to be sent via [address]

## 2023-01-09 ENCOUNTER — TELEPHONE (OUTPATIENT)
Facility: CLINIC | Age: 38
End: 2023-01-09

## 2023-01-09 NOTE — TELEPHONE ENCOUNTER
Pt is calling to verify how she should split the dose for miralax and gatorade before her CLN on 1/11/2023

## 2023-01-11 ENCOUNTER — LAB REQUISITION (OUTPATIENT)
Dept: SURGERY | Age: 38
End: 2023-01-11
Payer: COMMERCIAL

## 2023-01-11 ENCOUNTER — SURGERY CENTER DOCUMENTATION (OUTPATIENT)
Dept: SURGERY | Age: 38
End: 2023-01-11

## 2023-01-11 DIAGNOSIS — Z12.11 SPECIAL SCREENING FOR MALIGNANT NEOPLASMS, COLON: ICD-10-CM

## 2023-01-11 DIAGNOSIS — Z86.010 HISTORY OF ADENOMATOUS POLYP OF COLON: ICD-10-CM

## 2023-01-11 DIAGNOSIS — Z86.010 PERSONAL HISTORY OF COLONIC POLYPS: ICD-10-CM

## 2023-01-11 PROCEDURE — 88305 TISSUE EXAM BY PATHOLOGIST: CPT | Performed by: INTERNAL MEDICINE

## 2023-01-11 PROCEDURE — 45380 COLONOSCOPY AND BIOPSY: CPT | Performed by: INTERNAL MEDICINE

## 2023-01-12 ENCOUNTER — MED REC SCAN ONLY (OUTPATIENT)
Facility: CLINIC | Age: 38
End: 2023-01-12

## 2023-01-12 ENCOUNTER — TELEPHONE (OUTPATIENT)
Dept: GASTROENTEROLOGY | Facility: CLINIC | Age: 38
End: 2023-01-12

## 2023-01-12 NOTE — TELEPHONE ENCOUNTER
Health maintenance updated. Last colonoscopy done 1/11/23. 10 year recall placed into [facility], next due on 1/11/33 per Dr. Anthony Arrington.

## 2023-01-12 NOTE — TELEPHONE ENCOUNTER
GI staff: please place recall in for colonoscopy in 10 years     Then close encounter    Thanks    Ruby Sorto MD  [address] - Gastroenterology  1/12/2023  12:54 PM

## 2023-03-29 ENCOUNTER — HOSPITAL ENCOUNTER (OUTPATIENT)
Age: 38
Discharge: HOME | End: 2023-03-29
Payer: COMMERCIAL

## 2023-03-29 VITALS
SYSTOLIC BLOOD PRESSURE: 137 MMHG | DIASTOLIC BLOOD PRESSURE: 77 MMHG | HEART RATE: 74 BPM | OXYGEN SATURATION: 100 % | RESPIRATION RATE: 20 BRPM | TEMPERATURE: 98 F

## 2023-03-29 DIAGNOSIS — J06.9 VIRAL URI: Primary | ICD-10-CM

## 2023-03-29 DIAGNOSIS — R59.0 LYMPHADENOPATHY OF RIGHT CERVICAL REGION: ICD-10-CM

## 2023-03-29 LAB
S PYO AG THROAT QL IA.RAPID: NEGATIVE
SARS-COV-2 RNA RESP QL NAA+PROBE: NOT DETECTED

## 2023-03-29 PROCEDURE — 99213 OFFICE O/P EST LOW 20 MIN: CPT

## 2023-03-29 PROCEDURE — 87651 STREP A DNA AMP PROBE: CPT | Performed by: NURSE PRACTITIONER

## 2023-03-29 PROCEDURE — 99212 OFFICE O/P EST SF 10 MIN: CPT

## 2023-03-29 NOTE — ED INITIAL ASSESSMENT (HPI)
Presents with 3 days of swollen gland to right side of neck under chin. Tender to touch. No fever. Denies congestion or sore throat.

## 2023-03-29 NOTE — DISCHARGE INSTRUCTIONS
Your enlarged lymph node is likely reactive to illness. If still present at 2 week willi, please follow up with your pcp for further evaluaiton.    Lots of fluids  Steam showers  If symptoms worsen in the interim, please return

## 2023-06-27 ENCOUNTER — TELEPHONE (OUTPATIENT)
Dept: SURGERY | Facility: CLINIC | Age: 38
End: 2023-06-27

## 2023-06-30 ENCOUNTER — NURSE TRIAGE (OUTPATIENT)
Dept: FAMILY MEDICINE CLINIC | Facility: CLINIC | Age: 38
End: 2023-06-30

## 2023-07-06 ENCOUNTER — HOSPITAL ENCOUNTER (OUTPATIENT)
Age: 38
Discharge: HOME | End: 2023-07-06
Attending: EMERGENCY MEDICINE
Payer: COMMERCIAL

## 2023-07-06 VITALS
OXYGEN SATURATION: 98 % | DIASTOLIC BLOOD PRESSURE: 60 MMHG | TEMPERATURE: 97 F | SYSTOLIC BLOOD PRESSURE: 129 MMHG | HEART RATE: 83 BPM | RESPIRATION RATE: 18 BRPM

## 2023-07-06 DIAGNOSIS — J06.9 UPPER RESPIRATORY TRACT INFECTION, UNSPECIFIED TYPE: Primary | ICD-10-CM

## 2023-07-06 LAB — S PYO AG THROAT QL IA.RAPID: NEGATIVE

## 2023-07-06 PROCEDURE — 99212 OFFICE O/P EST SF 10 MIN: CPT

## 2023-07-06 PROCEDURE — 87651 STREP A DNA AMP PROBE: CPT | Performed by: EMERGENCY MEDICINE

## 2023-07-06 PROCEDURE — 99213 OFFICE O/P EST LOW 20 MIN: CPT

## 2023-08-04 ENCOUNTER — ORDER TRANSCRIPTION (OUTPATIENT)
Dept: ADMINISTRATIVE | Facility: HOSPITAL | Age: 38
End: 2023-08-04

## 2023-08-04 DIAGNOSIS — Z12.31 ENCOUNTER FOR MAMMOGRAM TO ESTABLISH BASELINE MAMMOGRAM: Primary | ICD-10-CM

## 2023-08-07 ENCOUNTER — OFFICE VISIT (OUTPATIENT)
Dept: INTERNAL MEDICINE CLINIC | Facility: CLINIC | Age: 38
End: 2023-08-07
Payer: COMMERCIAL

## 2023-08-07 ENCOUNTER — OFFICE VISIT (OUTPATIENT)
Dept: OTOLARYNGOLOGY | Facility: CLINIC | Age: 38
End: 2023-08-07

## 2023-08-07 VITALS
BODY MASS INDEX: 37.77 KG/M2 | WEIGHT: 235 LBS | DIASTOLIC BLOOD PRESSURE: 64 MMHG | HEART RATE: 74 BPM | HEIGHT: 66 IN | OXYGEN SATURATION: 99 % | RESPIRATION RATE: 18 BRPM | SYSTOLIC BLOOD PRESSURE: 118 MMHG

## 2023-08-07 VITALS — HEIGHT: 66 IN | WEIGHT: 235 LBS | TEMPERATURE: 96 F | BODY MASS INDEX: 37.77 KG/M2

## 2023-08-07 DIAGNOSIS — E78.2 HYPERCHOLESTEROLEMIA WITH HYPERTRIGLYCERIDEMIA: ICD-10-CM

## 2023-08-07 DIAGNOSIS — Z51.81 ENCOUNTER FOR THERAPEUTIC DRUG MONITORING: Primary | ICD-10-CM

## 2023-08-07 DIAGNOSIS — E66.01 CLASS 2 SEVERE OBESITY WITH SERIOUS COMORBIDITY AND BODY MASS INDEX (BMI) OF 37.0 TO 37.9 IN ADULT, UNSPECIFIED OBESITY TYPE: ICD-10-CM

## 2023-08-07 DIAGNOSIS — E88.81 INSULIN RESISTANCE: ICD-10-CM

## 2023-08-07 DIAGNOSIS — R63.8 CRAVING FOR PARTICULAR FOOD: ICD-10-CM

## 2023-08-07 DIAGNOSIS — J01.90 ACUTE SINUSITIS, RECURRENCE NOT SPECIFIED, UNSPECIFIED LOCATION: Primary | ICD-10-CM

## 2023-08-07 PROCEDURE — 3008F BODY MASS INDEX DOCD: CPT | Performed by: NURSE PRACTITIONER

## 2023-08-07 PROCEDURE — 3074F SYST BP LT 130 MM HG: CPT | Performed by: NURSE PRACTITIONER

## 2023-08-07 PROCEDURE — 99204 OFFICE O/P NEW MOD 45 MIN: CPT | Performed by: NURSE PRACTITIONER

## 2023-08-07 PROCEDURE — 3078F DIAST BP <80 MM HG: CPT | Performed by: NURSE PRACTITIONER

## 2023-08-07 RX ORDER — PHENTERMINE HYDROCHLORIDE 15 MG/1
15 CAPSULE ORAL EVERY MORNING
Qty: 30 CAPSULE | Refills: 2 | Status: SHIPPED | OUTPATIENT
Start: 2023-08-07

## 2023-08-07 RX ORDER — TOPIRAMATE 25 MG/1
25 TABLET ORAL 2 TIMES DAILY
Qty: 60 TABLET | Refills: 2 | Status: SHIPPED | OUTPATIENT
Start: 2023-08-07

## 2023-08-10 ENCOUNTER — LAB ENCOUNTER (OUTPATIENT)
Dept: LAB | Age: 38
End: 2023-08-10
Attending: NURSE PRACTITIONER
Payer: COMMERCIAL

## 2023-08-10 DIAGNOSIS — E78.2 HYPERCHOLESTEROLEMIA WITH HYPERTRIGLYCERIDEMIA: ICD-10-CM

## 2023-08-10 DIAGNOSIS — E66.01 CLASS 2 SEVERE OBESITY WITH SERIOUS COMORBIDITY AND BODY MASS INDEX (BMI) OF 37.0 TO 37.9 IN ADULT, UNSPECIFIED OBESITY TYPE: ICD-10-CM

## 2023-08-10 DIAGNOSIS — Z51.81 ENCOUNTER FOR THERAPEUTIC DRUG MONITORING: ICD-10-CM

## 2023-08-10 DIAGNOSIS — E88.81 INSULIN RESISTANCE: ICD-10-CM

## 2023-08-10 LAB
ALBUMIN SERPL-MCNC: 3.3 G/DL (ref 3.4–5)
ALBUMIN/GLOB SERPL: 0.9 {RATIO} (ref 1–2)
ALP LIVER SERPL-CCNC: 64 U/L
ALT SERPL-CCNC: 23 U/L
ANION GAP SERPL CALC-SCNC: 6 MMOL/L (ref 0–18)
AST SERPL-CCNC: 9 U/L (ref 15–37)
BASOPHILS # BLD AUTO: 0.05 X10(3) UL (ref 0–0.2)
BASOPHILS NFR BLD AUTO: 0.6 %
BILIRUB SERPL-MCNC: 0.3 MG/DL (ref 0.1–2)
BUN BLD-MCNC: 14 MG/DL (ref 7–18)
BUN/CREAT SERPL: 16.3 (ref 10–20)
CALCIUM BLD-MCNC: 8.8 MG/DL (ref 8.5–10.1)
CHLORIDE SERPL-SCNC: 107 MMOL/L (ref 98–112)
CHOLEST SERPL-MCNC: 218 MG/DL (ref ?–200)
CO2 SERPL-SCNC: 27 MMOL/L (ref 21–32)
CREAT BLD-MCNC: 0.86 MG/DL
DEPRECATED RDW RBC AUTO: 42.9 FL (ref 35.1–46.3)
EGFRCR SERPLBLD CKD-EPI 2021: 88 ML/MIN/1.73M2 (ref 60–?)
EOSINOPHIL # BLD AUTO: 0.17 X10(3) UL (ref 0–0.7)
EOSINOPHIL NFR BLD AUTO: 1.9 %
ERYTHROCYTE [DISTWIDTH] IN BLOOD BY AUTOMATED COUNT: 12.9 % (ref 11–15)
EST. AVERAGE GLUCOSE BLD GHB EST-MCNC: 105 MG/DL (ref 68–126)
FASTING PATIENT LIPID ANSWER: YES
FASTING STATUS PATIENT QL REPORTED: YES
GLOBULIN PLAS-MCNC: 3.7 G/DL (ref 2.8–4.4)
GLUCOSE BLD-MCNC: 94 MG/DL (ref 70–99)
HBA1C MFR BLD: 5.3 % (ref ?–5.7)
HCT VFR BLD AUTO: 41.4 %
HDLC SERPL-MCNC: 36 MG/DL (ref 40–59)
HGB BLD-MCNC: 13.8 G/DL
IMM GRANULOCYTES # BLD AUTO: 0.03 X10(3) UL (ref 0–1)
IMM GRANULOCYTES NFR BLD: 0.3 %
LDLC SERPL CALC-MCNC: 122 MG/DL (ref ?–100)
LYMPHOCYTES # BLD AUTO: 2.1 X10(3) UL (ref 1–4)
LYMPHOCYTES NFR BLD AUTO: 23.6 %
MCH RBC QN AUTO: 30.6 PG (ref 26–34)
MCHC RBC AUTO-ENTMCNC: 33.3 G/DL (ref 31–37)
MCV RBC AUTO: 91.8 FL
MONOCYTES # BLD AUTO: 0.67 X10(3) UL (ref 0.1–1)
MONOCYTES NFR BLD AUTO: 7.5 %
NEUTROPHILS # BLD AUTO: 5.87 X10 (3) UL (ref 1.5–7.7)
NEUTROPHILS # BLD AUTO: 5.87 X10(3) UL (ref 1.5–7.7)
NEUTROPHILS NFR BLD AUTO: 66.1 %
NONHDLC SERPL-MCNC: 182 MG/DL (ref ?–130)
OSMOLALITY SERPL CALC.SUM OF ELEC: 290 MOSM/KG (ref 275–295)
PLATELET # BLD AUTO: 280 10(3)UL (ref 150–450)
POTASSIUM SERPL-SCNC: 4.2 MMOL/L (ref 3.5–5.1)
PROT SERPL-MCNC: 7 G/DL (ref 6.4–8.2)
RBC # BLD AUTO: 4.51 X10(6)UL
SODIUM SERPL-SCNC: 140 MMOL/L (ref 136–145)
T4 FREE SERPL-MCNC: 0.9 NG/DL (ref 0.8–1.7)
TRIGL SERPL-MCNC: 341 MG/DL (ref 30–149)
TSI SER-ACNC: 3.3 MIU/ML (ref 0.36–3.74)
VLDLC SERPL CALC-MCNC: 62 MG/DL (ref 0–30)
WBC # BLD AUTO: 8.9 X10(3) UL (ref 4–11)

## 2023-08-10 PROCEDURE — 80050 GENERAL HEALTH PANEL: CPT | Performed by: NURSE PRACTITIONER

## 2023-08-10 PROCEDURE — 84439 ASSAY OF FREE THYROXINE: CPT | Performed by: NURSE PRACTITIONER

## 2023-08-10 PROCEDURE — 83036 HEMOGLOBIN GLYCOSYLATED A1C: CPT | Performed by: NURSE PRACTITIONER

## 2023-08-10 PROCEDURE — 80061 LIPID PANEL: CPT | Performed by: NURSE PRACTITIONER

## 2023-08-17 ENCOUNTER — APPOINTMENT (OUTPATIENT)
Dept: URBAN - METROPOLITAN AREA CLINIC 248 | Age: 38
Setting detail: DERMATOLOGY
End: 2023-08-18

## 2023-08-17 DIAGNOSIS — D18.0 HEMANGIOMA: ICD-10-CM

## 2023-08-17 DIAGNOSIS — L81.4 OTHER MELANIN HYPERPIGMENTATION: ICD-10-CM

## 2023-08-17 DIAGNOSIS — L82.1 OTHER SEBORRHEIC KERATOSIS: ICD-10-CM

## 2023-08-17 DIAGNOSIS — D22 MELANOCYTIC NEVI: ICD-10-CM

## 2023-08-17 PROBLEM — D18.01 HEMANGIOMA OF SKIN AND SUBCUTANEOUS TISSUE: Status: ACTIVE | Noted: 2023-08-17

## 2023-08-17 PROBLEM — D23.39 OTHER BENIGN NEOPLASM OF SKIN OF OTHER PARTS OF FACE: Status: ACTIVE | Noted: 2023-08-17

## 2023-08-17 PROBLEM — D23.71 OTHER BENIGN NEOPLASM OF SKIN OF RIGHT LOWER LIMB, INCLUDING HIP: Status: ACTIVE | Noted: 2023-08-17

## 2023-08-17 PROBLEM — D22.5 MELANOCYTIC NEVI OF TRUNK: Status: ACTIVE | Noted: 2023-08-17

## 2023-08-17 PROCEDURE — OTHER COUNSELING: OTHER

## 2023-08-17 PROCEDURE — 99213 OFFICE O/P EST LOW 20 MIN: CPT

## 2023-08-17 ASSESSMENT — LOCATION DETAILED DESCRIPTION DERM
LOCATION DETAILED: RIGHT RIB CAGE
LOCATION DETAILED: PERIUMBILICAL SKIN
LOCATION DETAILED: RIGHT RIB CAGE

## 2023-08-17 ASSESSMENT — LOCATION ZONE DERM
LOCATION ZONE: TRUNK
LOCATION ZONE: TRUNK

## 2023-08-17 ASSESSMENT — LOCATION SIMPLE DESCRIPTION DERM
LOCATION SIMPLE: ABDOMEN
LOCATION SIMPLE: ABDOMEN

## 2023-08-22 ENCOUNTER — TELEPHONE (OUTPATIENT)
Dept: INTERNAL MEDICINE CLINIC | Facility: CLINIC | Age: 38
End: 2023-08-22

## 2023-09-20 ENCOUNTER — HOSPITAL ENCOUNTER (OUTPATIENT)
Dept: MAMMOGRAPHY | Age: 38
Discharge: HOME | End: 2023-09-20
Attending: FAMILY MEDICINE
Payer: COMMERCIAL

## 2023-09-20 DIAGNOSIS — Z12.31 ENCOUNTER FOR MAMMOGRAM TO ESTABLISH BASELINE MAMMOGRAM: ICD-10-CM

## 2023-09-20 DIAGNOSIS — Z12.31 ENCOUNTER FOR SCREENING MAMMOGRAM FOR MALIGNANT NEOPLASM OF BREAST: ICD-10-CM

## 2023-09-20 PROCEDURE — 77063 BREAST TOMOSYNTHESIS BI: CPT | Performed by: FAMILY MEDICINE

## 2023-09-20 PROCEDURE — 77067 SCR MAMMO BI INCL CAD: CPT | Performed by: FAMILY MEDICINE

## 2023-09-27 ENCOUNTER — TELEPHONE (OUTPATIENT)
Dept: FAMILY MEDICINE CLINIC | Facility: CLINIC | Age: 38
End: 2023-09-27

## 2023-09-27 ENCOUNTER — HOSPITAL ENCOUNTER (OUTPATIENT)
Dept: ULTRASOUND IMAGING | Facility: HOSPITAL | Age: 38
Discharge: HOME | End: 2023-09-27
Attending: FAMILY MEDICINE
Payer: COMMERCIAL

## 2023-09-27 ENCOUNTER — HOSPITAL ENCOUNTER (OUTPATIENT)
Dept: MAMMOGRAPHY | Facility: HOSPITAL | Age: 38
Discharge: HOME | End: 2023-09-27
Attending: FAMILY MEDICINE
Payer: COMMERCIAL

## 2023-09-27 DIAGNOSIS — R92.8 ABNORMAL MAMMOGRAM: ICD-10-CM

## 2023-09-27 DIAGNOSIS — R92.8 ABNORMAL MAMMOGRAM: Primary | ICD-10-CM

## 2023-09-27 PROCEDURE — 77061 BREAST TOMOSYNTHESIS UNI: CPT | Performed by: FAMILY MEDICINE

## 2023-09-27 PROCEDURE — 76642 ULTRASOUND BREAST LIMITED: CPT | Performed by: FAMILY MEDICINE

## 2023-09-27 PROCEDURE — 77065 DX MAMMO INCL CAD UNI: CPT | Performed by: FAMILY MEDICINE

## 2023-09-27 NOTE — TELEPHONE ENCOUNTER
[facility] Breast coordinator indicated that patient had mammogram today and found 2 masses in the right breast. Need to do a right breast biopsy x2. Needs orders. Please advise.

## 2023-09-28 NOTE — DISCHARGE INSTRUCTIONS
The Doctor (Radiologist) who performed your procedure was: DR Emily Mcwilliams Dr an ice pack over the biopsy site on top of your bra or on top of the ACE wrap (never apply ice directly over skin) for 10-15 minutes of every hour until bedtime for your comfort and to decrease bleeding. Keep your sports bra or the ACE wrap (stereotactic and MRI biopsy) in place for 24 hours after your biopsy. This compression decreases bleeding and breast movement for your comfort. Wear a supportive bra for the next couple of days for comfort (sports bra for sleep). Continue to wear, preferably, a sports bra or good supportive bra for 1 week and take off only to shower. No baths or showers during the first 24 hours after biopsy. After this time you may take a shower. It's okay if the strips get wet but do not soak them. NO saunas, hot tubs or swimming until steri-strips fall off (approx. 5 days). This prevents infection and allows time for them to completely close and heal.  DO NOT remove the steri-strips. They will fall off in 5 days. If any type of irritation (redness, itching or blisters) develops in the area around the steri-strips, remove them gently. If the steri-strips do not fall off after 5 days, gently remove them. Keep the area clean and dry. It is normal to have mild discomfort and bruising at the biopsy site. You may take Tylenol as needed for discomfort, as long as you have no allergies to Tylenol. Do not take aspirin, motrin, ibuprofen or any medication containing NSAID (non-steroidal anti-inflammatory drug) product for 48 hours. DO NOT participate in strenuous activity (aerobics, heavy lifting, housework, gardening, etc.) 48 hours after your biopsy to prevent bleeding. You will receive results in 2-3 business days. If you are having an MRI breast biopsy or an Ultrasound guided breast biopsy, you will be billed for the biopsy and unilateral mammogram separately.   If you have any questions about the procedure or your results, please contact the Breast Care Coordinator Nurse at [phone]. Notify your ordering physician or primary physician for increased bleeding, pain or fever over 100. Or contact a Radiology Nurse at [phone] between 8am-4pm (after 4pm, your call will be directed to the [facility] Emergency Room).

## 2023-10-02 ENCOUNTER — HOSPITAL ENCOUNTER (OUTPATIENT)
Dept: MAMMOGRAPHY | Facility: HOSPITAL | Age: 38
Discharge: HOME | End: 2023-10-02
Attending: FAMILY MEDICINE
Payer: COMMERCIAL

## 2023-10-02 ENCOUNTER — HOSPITAL ENCOUNTER (OUTPATIENT)
Dept: ULTRASOUND IMAGING | Facility: HOSPITAL | Age: 38
Discharge: HOME | End: 2023-10-02
Attending: FAMILY MEDICINE
Payer: COMMERCIAL

## 2023-10-02 DIAGNOSIS — R92.8 ABNORMAL MAMMOGRAM: ICD-10-CM

## 2023-10-02 PROCEDURE — 77065 DX MAMMO INCL CAD UNI: CPT | Performed by: FAMILY MEDICINE

## 2023-10-02 PROCEDURE — 19083 BX BREAST 1ST LESION US IMAG: CPT | Performed by: FAMILY MEDICINE

## 2023-10-02 PROCEDURE — 88305 TISSUE EXAM BY PATHOLOGIST: CPT | Performed by: FAMILY MEDICINE

## 2023-10-02 PROCEDURE — 19084 BX BREAST ADD LESION US IMAG: CPT | Performed by: FAMILY MEDICINE

## 2023-10-02 NOTE — PROCEDURES
[facility]  Procedure Note    [address] Patient Status:  Outpatient    1985 MRN [id number]   Location [address] Attending Benja Johnson, [address] [facility] Day # 0 PCP Bernardino Ace.  Benja Johnson MD     Procedure: Right breast ultrasound guided biopsy 2 sites    Pre-Procedure Diagnosis:  Right breast masses    Post-Procedure Diagnosis: Right breast masses    Anesthesia:  Local    Findings:  Right breast masses    Specimens: multiple cores at each site    Blood Loss:  minimal    Tourniquet Time: none  Complications:  None  Drains:  none        Lynn Tyson MD  10/2/2023

## 2023-10-03 ENCOUNTER — TELEPHONE (OUTPATIENT)
Dept: ULTRASOUND IMAGING | Facility: HOSPITAL | Age: 38
End: 2023-10-03

## 2023-10-03 NOTE — TELEPHONE ENCOUNTER
Phu Molina is s/p biopsy . Returned my call. I introduced myself as breast coordinator . Reinforced to patient  post biopsy care and instructions . No c/o post bx. Informed  and shared the pathology results as well as the recommendations from Dr Jamila Palomares for her breast imaging  as follows:      Pathology results shared (see epic for dictated pathology and radiology procedure report)  and recommendations are as follows:       RECOMMENDATION:  Per the 9/27/2023 report, a 6-month follow-up right breast diagnostic mammogram was recommended for follow-up of a right breast mammographic asymmetry noted on baseline screening mammogram           [address] the above and denies questions. Phu Molina was also instructed to perform breast self exams and if any changes  develops any changes to contact ordering  physician immediately  for re evaluation. Linda Carlson Survanceverbalizes understanding and agreement.

## 2023-10-03 NOTE — TELEPHONE ENCOUNTER
Follow up call post bx no answer I left a detail message to return call to [phone] to review results and recommendations with her.

## 2023-10-23 ENCOUNTER — OFFICE VISIT (OUTPATIENT)
Dept: OBGYN CLINIC | Facility: CLINIC | Age: 38
End: 2023-10-23
Payer: COMMERCIAL

## 2023-10-23 VITALS
HEIGHT: 66 IN | WEIGHT: 235 LBS | BODY MASS INDEX: 37.77 KG/M2 | DIASTOLIC BLOOD PRESSURE: 74 MMHG | HEART RATE: 76 BPM | SYSTOLIC BLOOD PRESSURE: 121 MMHG

## 2023-10-23 DIAGNOSIS — N92.0 MENORRHAGIA WITH REGULAR CYCLE: ICD-10-CM

## 2023-10-23 DIAGNOSIS — Z01.419 ENCOUNTER FOR GYNECOLOGICAL EXAMINATION WITHOUT ABNORMAL FINDING: Primary | ICD-10-CM

## 2023-10-23 DIAGNOSIS — R92.8 ABNORMAL MAMMOGRAM OF RIGHT BREAST: ICD-10-CM

## 2023-10-24 NOTE — PROGRESS NOTES
Subjective:   Patient ID: Roro Diamond is a 34year old female. HPI   with menses q 27d / 7-8d / 5 heavy days. No IM or PC bleeding. Small fibroid seen on CT in . Xavier Rhoades had vasectomy proven. No new family or personal medical issues. History/Other:   Review of Systems   Constitutional:  Negative for appetite change, fatigue and unexpected weight change. Eyes:  Negative for visual disturbance. Respiratory:  Negative for cough and shortness of breath. Cardiovascular:  Negative for chest pain, palpitations and leg swelling. Gastrointestinal:  Negative for abdominal distention, abdominal pain, blood in stool, constipation and diarrhea. Genitourinary:  Negative for dyspareunia, dysuria, frequency, menstrual problem, pelvic pain and urgency. Musculoskeletal:  Negative for arthralgias and myalgias. Skin:  Negative for rash. Neurological:  Negative for weakness, numbness and headaches. Psychiatric/Behavioral:  Negative for dysphoric mood. The patient is not nervous/anxious. Current Outpatient Medications   Medication Sig Dispense Refill    Phentermine HCl 15 MG Oral Cap Take 1 capsule (15 mg total) by mouth every morning. 30 capsule 2    Testosterone Does not apply Powder       Ascorbic Acid (VITAMIN C) 100 MG Oral Tab Take 1 tablet (100 mg total) by mouth daily. Ergocalciferol (VITAMIN D OR) Take 1 tablet by mouth daily. Omega-3 Fatty Acids (FISH OIL OR) Take by mouth daily. topiramate 25 MG Oral Tab Take 1 tablet (25 mg total) by mouth 2 (two) times daily. Start 1 tab daily for 7 days, then can increase to twice a day as prescribed 60 tablet 2     Allergies:No Known Allergies    Objective:   Physical Exam  Constitutional:       General: She is not in acute distress. Appearance: She is well-developed. She is not diaphoretic. Neck:      Thyroid: No thyromegaly. Cardiovascular:      Rate and Rhythm: Normal rate and regular rhythm.       Heart sounds: Normal heart sounds. No murmur heard. Pulmonary:      Effort: Pulmonary effort is normal.      Breath sounds: Normal breath sounds. No wheezing or rales. Chest:   Breasts:     Right: Normal. No mass, nipple discharge, skin change or tenderness. Left: Normal. No mass, nipple discharge, skin change or tenderness. Comments:     Abdominal:      General: There is no distension. Palpations: Abdomen is soft. There is no mass. Tenderness: There is no abdominal tenderness. There is no guarding or rebound. Genitourinary:     Labia:         Right: No rash or lesion. Left: No rash or lesion. Vagina: Normal. No vaginal discharge. Cervix: No cervical motion tenderness or discharge. Uterus: Not enlarged and not tender. Adnexa:         Right: No mass, tenderness or fullness. Left: No mass, tenderness or fullness. Musculoskeletal:         General: No tenderness. Cervical back: Neck supple. Lymphadenopathy:      Cervical: No cervical adenopathy. Upper Body:      Right upper body: No supraclavicular, axillary or pectoral adenopathy. Left upper body: No supraclavicular, axillary or pectoral adenopathy. Neurological:      Mental Status: She is alert. Assessment & Plan:   Encounter for gynecological examination without abnormal finding  (primary encounter diagnosis)  Abnormal mammogram of right breast  Menorrhagia with regular cycle  We reviewed options for menorrhagia treatments including OCP which I would avoid in this age group, PO TXA, Mirena IUD and Ablation. I recommended Mirena as best option. She had normal H/H by recent CBC so no urgency to consider treatment. We may consider US if symptoms worsen. No orders of the defined types were placed in this encounter.       Meds This Visit:  Requested Prescriptions      No prescriptions requested or ordered in this encounter       Imaging & Referrals:  [facility]INA 2D+3D DIAGNOSTIC [facility] RIGHT (CPT=[id number])

## 2023-10-25 ENCOUNTER — TELEPHONE (OUTPATIENT)
Dept: OBGYN CLINIC | Facility: CLINIC | Age: 38
End: 2023-10-25

## 2023-10-25 NOTE — TELEPHONE ENCOUNTER
Notes from [facility] office visit on 10/23/23: We reviewed options for menorrhagia treatments including OCP which I would avoid in this age group, PO TXA, Mirena IUD and Ablation. I recommended Mirena as best option. She had normal H/H by recent CBC so no urgency to consider treatment. We may consider US if symptoms worsen. Lmtcb. Pt needs to call on FIRST day of next period to schedule IUD insertion appt.

## 2023-10-25 NOTE — TELEPHONE ENCOUNTER
Pt scheduled for IUD insert of 11/9/23 with [facility] in [facility] based on expected date of next menses. She is informed that IUD must be inserted D 1-7 of cycle, so if appt does not fall within that window, would need to reschedule. Patient verbalized understanding. Recs given for Motrin 600 mg 1 hr prior to appt time. UPT day of if needed,  with vasectomy. Patient verbalized understanding.

## 2023-11-01 ENCOUNTER — HOSPITAL ENCOUNTER (EMERGENCY)
Facility: HOSPITAL | Age: 38
Discharge: HOME | End: 2023-11-01
Attending: EMERGENCY MEDICINE
Payer: COMMERCIAL

## 2023-11-01 ENCOUNTER — APPOINTMENT (OUTPATIENT)
Dept: CT IMAGING | Facility: HOSPITAL | Age: 38
End: 2023-11-01
Attending: EMERGENCY MEDICINE
Payer: COMMERCIAL

## 2023-11-01 VITALS
DIASTOLIC BLOOD PRESSURE: 67 MMHG | HEART RATE: 76 BPM | OXYGEN SATURATION: 100 % | TEMPERATURE: 98 F | RESPIRATION RATE: 20 BRPM | SYSTOLIC BLOOD PRESSURE: 122 MMHG

## 2023-11-01 DIAGNOSIS — K57.92 ACUTE DIVERTICULITIS: Primary | ICD-10-CM

## 2023-11-01 LAB
ALBUMIN SERPL-MCNC: 4.4 G/DL (ref 3.2–4.8)
ALBUMIN/GLOB SERPL: 1.6 {RATIO} (ref 1–2)
ALP LIVER SERPL-CCNC: 72 U/L
ALT SERPL-CCNC: 25 U/L
ANION GAP SERPL CALC-SCNC: 9 MMOL/L (ref 0–18)
AST SERPL-CCNC: 14 U/L (ref ?–34)
B-HCG UR QL: NEGATIVE
BASOPHILS # BLD AUTO: 0.06 X10(3) UL (ref 0–0.2)
BASOPHILS NFR BLD AUTO: 0.5 %
BILIRUB SERPL-MCNC: 0.3 MG/DL (ref 0.3–1.2)
BILIRUB UR QL: NEGATIVE
BUN BLD-MCNC: 18 MG/DL (ref 9–23)
BUN/CREAT SERPL: 20.9 (ref 10–20)
CALCIUM BLD-MCNC: 9.6 MG/DL (ref 8.7–10.4)
CHLORIDE SERPL-SCNC: 106 MMOL/L (ref 98–112)
CLARITY UR: CLEAR
CO2 SERPL-SCNC: 25 MMOL/L (ref 21–32)
CREAT BLD-MCNC: 0.86 MG/DL
DEPRECATED RDW RBC AUTO: 42.5 FL (ref 35.1–46.3)
EGFRCR SERPLBLD CKD-EPI 2021: 88 ML/MIN/1.73M2 (ref 60–?)
EOSINOPHIL # BLD AUTO: 0.18 X10(3) UL (ref 0–0.7)
EOSINOPHIL NFR BLD AUTO: 1.4 %
ERYTHROCYTE [DISTWIDTH] IN BLOOD BY AUTOMATED COUNT: 12.8 % (ref 11–15)
GLOBULIN PLAS-MCNC: 2.7 G/DL (ref 2.8–4.4)
GLUCOSE BLD-MCNC: 98 MG/DL (ref 70–99)
GLUCOSE UR-MCNC: NORMAL MG/DL
HCT VFR BLD AUTO: 38.3 %
HGB BLD-MCNC: 12.8 G/DL
HGB UR QL STRIP.AUTO: NEGATIVE
IMM GRANULOCYTES # BLD AUTO: 0.03 X10(3) UL (ref 0–1)
IMM GRANULOCYTES NFR BLD: 0.2 %
KETONES UR-MCNC: NEGATIVE MG/DL
LEUKOCYTE ESTERASE UR QL STRIP.AUTO: NEGATIVE
LIPASE SERPL-CCNC: 31 U/L (ref 13–75)
LYMPHOCYTES # BLD AUTO: 2.23 X10(3) UL (ref 1–4)
LYMPHOCYTES NFR BLD AUTO: 17.4 %
MCH RBC QN AUTO: 30.4 PG (ref 26–34)
MCHC RBC AUTO-ENTMCNC: 33.4 G/DL (ref 31–37)
MCV RBC AUTO: 91 FL
MONOCYTES # BLD AUTO: 0.82 X10(3) UL (ref 0.1–1)
MONOCYTES NFR BLD AUTO: 6.4 %
NEUTROPHILS # BLD AUTO: 9.53 X10 (3) UL (ref 1.5–7.7)
NEUTROPHILS # BLD AUTO: 9.53 X10(3) UL (ref 1.5–7.7)
NEUTROPHILS NFR BLD AUTO: 74.1 %
NITRITE UR QL STRIP.AUTO: NEGATIVE
OSMOLALITY SERPL CALC.SUM OF ELEC: 292 MOSM/KG (ref 275–295)
PH UR: 5 [PH] (ref 5–8)
PLATELET # BLD AUTO: 338 10(3)UL (ref 150–450)
POTASSIUM SERPL-SCNC: 4.1 MMOL/L (ref 3.5–5.1)
PROT SERPL-MCNC: 7.1 G/DL (ref 5.7–8.2)
PROT UR-MCNC: NEGATIVE MG/DL
RBC # BLD AUTO: 4.21 X10(6)UL
SODIUM SERPL-SCNC: 140 MMOL/L (ref 136–145)
SP GR UR STRIP: 1.02 (ref 1–1.03)
UROBILINOGEN UR STRIP-ACNC: NORMAL
WBC # BLD AUTO: 12.9 X10(3) UL (ref 4–11)

## 2023-11-01 PROCEDURE — 74177 CT ABD & PELVIS W/CONTRAST: CPT | Performed by: EMERGENCY MEDICINE

## 2023-11-01 PROCEDURE — 83690 ASSAY OF LIPASE: CPT | Performed by: EMERGENCY MEDICINE

## 2023-11-01 PROCEDURE — 99284 EMERGENCY DEPT VISIT MOD MDM: CPT

## 2023-11-01 PROCEDURE — 85025 COMPLETE CBC W/AUTO DIFF WBC: CPT | Performed by: EMERGENCY MEDICINE

## 2023-11-01 PROCEDURE — 80053 COMPREHEN METABOLIC PANEL: CPT | Performed by: EMERGENCY MEDICINE

## 2023-11-01 PROCEDURE — 99285 EMERGENCY DEPT VISIT HI MDM: CPT

## 2023-11-01 PROCEDURE — 96374 THER/PROPH/DIAG INJ IV PUSH: CPT

## 2023-11-01 PROCEDURE — 81003 URINALYSIS AUTO W/O SCOPE: CPT | Performed by: EMERGENCY MEDICINE

## 2023-11-01 PROCEDURE — 81025 URINE PREGNANCY TEST: CPT

## 2023-11-01 RX ORDER — MORPHINE SULFATE 4 MG/ML
4 INJECTION, SOLUTION INTRAMUSCULAR; INTRAVENOUS ONCE
Status: COMPLETED | OUTPATIENT
Start: 2023-11-01 | End: 2023-11-01

## 2023-11-01 RX ORDER — ACETAMINOPHEN 500 MG
1000 TABLET ORAL ONCE
Status: COMPLETED | OUTPATIENT
Start: 2023-11-01 | End: 2023-11-01

## 2023-11-01 RX ORDER — AMOXICILLIN AND CLAVULANATE POTASSIUM 875; 125 MG/1; MG/1
1 TABLET, FILM COATED ORAL 3 TIMES DAILY
Qty: 15 TABLET | Refills: 0 | Status: SHIPPED | OUTPATIENT
Start: 2023-11-01 | End: 2023-11-06

## 2023-11-01 RX ORDER — AMOXICILLIN AND CLAVULANATE POTASSIUM 875; 125 MG/1; MG/1
1 TABLET, FILM COATED ORAL 3 TIMES DAILY
Qty: 15 TABLET | Refills: 0 | Status: SHIPPED | OUTPATIENT
Start: 2023-11-01 | End: 2023-11-01

## 2023-11-01 RX ORDER — AMOXICILLIN AND CLAVULANATE POTASSIUM 875; 125 MG/1; MG/1
875 TABLET, FILM COATED ORAL ONCE
Status: COMPLETED | OUTPATIENT
Start: 2023-11-01 | End: 2023-11-01

## 2023-11-02 NOTE — ED QUICK NOTES
Pt to ED w/c of lower abd pain. Pt reports hx of diverticulitis, expresses concern of flare up. Pt denies N/V/D states he is about to travel out of the country but want to make sure everything is okay before doing so. Pt in no obvious distress.

## 2023-11-07 ENCOUNTER — TELEPHONE (OUTPATIENT)
Dept: OBGYN CLINIC | Facility: CLINIC | Age: 38
End: 2023-11-07

## 2023-11-09 ENCOUNTER — OFFICE VISIT (OUTPATIENT)
Dept: OBGYN CLINIC | Facility: CLINIC | Age: 38
End: 2023-11-09
Payer: COMMERCIAL

## 2023-11-09 VITALS
BODY MASS INDEX: 38 KG/M2 | DIASTOLIC BLOOD PRESSURE: 81 MMHG | WEIGHT: 232.5 LBS | HEART RATE: 77 BPM | SYSTOLIC BLOOD PRESSURE: 126 MMHG

## 2023-11-09 DIAGNOSIS — Z30.430 ENCOUNTER FOR INSERTION OF INTRAUTERINE CONTRACEPTIVE DEVICE (IUD): Primary | ICD-10-CM

## 2023-11-09 LAB
CONTROL LINE PRESENT WITH A CLEAR BACKGROUND (YES/NO): YES YES/NO
KIT LOT #: NORMAL NUMERIC
PREGNANCY TEST, URINE: NEGATIVE

## 2023-11-09 PROCEDURE — 58300 INSERT INTRAUTERINE DEVICE: CPT | Performed by: OBSTETRICS & GYNECOLOGY

## 2023-11-09 PROCEDURE — 81025 URINE PREGNANCY TEST: CPT | Performed by: OBSTETRICS & GYNECOLOGY

## 2023-11-09 PROCEDURE — 3079F DIAST BP 80-89 MM HG: CPT | Performed by: OBSTETRICS & GYNECOLOGY

## 2023-11-09 PROCEDURE — 3074F SYST BP LT 130 MM HG: CPT | Performed by: OBSTETRICS & GYNECOLOGY

## 2023-11-09 RX ORDER — GARLIC EXTRACT 500 MG
1 CAPSULE ORAL DAILY
COMMUNITY

## 2023-11-09 NOTE — PROGRESS NOTES
IUD Insertion     Pregnancy Results: negative from urine test   Birth control method(s) used:vasectomy    Consent signed. Procedure discussed with the patient in detail including indication, risks, benefits, alternatives and complications. Pelvic Exam Findings:  EG, vagina and cervix w/o lesions. Procedure:  Speculum placed in the vagina. Betadine wash of vagina and cervix. Single tooth tenaculum was placed at the 12 o'clock position. Cervical stenosis encountered. Disposable sound used to dilate cervix. Uterus sounded to 7 cm. Mirena IUD was placed without difficulty. Strings cut at 3 cm. Single tooth tenaculum removed. Good hemostasis noted. GC/CHL screen performed. Patient tolerated procedure well. Visit Plan:  IUD surveillance was discussed with the patient.

## 2023-12-27 ENCOUNTER — TELEPHONE (OUTPATIENT)
Dept: OBGYN CLINIC | Facility: CLINIC | Age: 38
End: 2023-12-27

## 2023-12-27 NOTE — TELEPHONE ENCOUNTER
Patient had an IUD placed the first week of November. She is concerned with the highs and lows she has been feeling emotionally and possible depression. No self harming thoughts. Please advise.

## 2023-12-27 NOTE — TELEPHONE ENCOUNTER
Pt had mirena IUD placed with [facility] on 11/9 and has some concerns about her mood. Pt stated that she has consistently noticed her mood swings getting worse since IUD was placed. States that she can \"feel her body get warm when she gets pissed\", and then will feel depressed. Pt stated that when she feels depressed she feels nothing is going her way and its hard to see the positives in things. Pt stated she does have a hx of anxiety and depression and does see a therapist biweekly. Pt stated she is currently not on any anxiety or depression meds. Pt stated that her therapist suggested she follow up with us to see if there is anything she can possible take temporarily to even out her mood swings until her body fully adjusts to the Mirena. Pt informed message will be sent to [facility] for recs and we will contact her as soon as he responds.     Pt denies any SI or HI. Pt advised if she ever experiences SI or HI she needs to go directly to the ER. Pt verbalized understanding.

## 2024-01-08 ENCOUNTER — HOSPITAL ENCOUNTER (OUTPATIENT)
Age: 39
Discharge: HOME | End: 2024-01-08
Payer: COMMERCIAL

## 2024-01-08 ENCOUNTER — APPOINTMENT (OUTPATIENT)
Dept: CT IMAGING | Age: 39
End: 2024-01-08
Attending: Physician Assistant
Payer: COMMERCIAL

## 2024-01-08 ENCOUNTER — TELEPHONE (OUTPATIENT)
Facility: CLINIC | Age: 39
End: 2024-01-08

## 2024-01-08 VITALS
OXYGEN SATURATION: 99 % | HEART RATE: 87 BPM | RESPIRATION RATE: 22 BRPM | TEMPERATURE: 99 F | DIASTOLIC BLOOD PRESSURE: 65 MMHG | SYSTOLIC BLOOD PRESSURE: 120 MMHG

## 2024-01-08 DIAGNOSIS — K57.92 ACUTE DIVERTICULITIS: Primary | ICD-10-CM

## 2024-01-08 LAB
#MXD IC: 1 X10ˆ3/UL (ref 0.1–1)
B-HCG UR QL: NEGATIVE
BILIRUB UR QL STRIP: NEGATIVE
BUN BLD-MCNC: 14 MG/DL (ref 7–18)
CHLORIDE BLD-SCNC: 102 MMOL/L (ref 98–112)
CO2 BLD-SCNC: 24 MMOL/L (ref 21–32)
COLOR UR: YELLOW
CREAT BLD-MCNC: 0.8 MG/DL
EGFRCR SERPLBLD CKD-EPI 2021: 95 ML/MIN/1.73M2 (ref 60–?)
GLUCOSE BLD-MCNC: 116 MG/DL (ref 70–99)
GLUCOSE UR STRIP-MCNC: NEGATIVE MG/DL
HCT VFR BLD AUTO: 37 %
HCT VFR BLD CALC: 39 %
HGB BLD-MCNC: 12.5 G/DL
ISTAT IONIZED CALCIUM FOR CHEM 8: 1.17 MMOL/L (ref 1.12–1.32)
LEUKOCYTE ESTERASE UR QL STRIP: NEGATIVE
LYMPHOCYTES # BLD AUTO: 0.9 X10ˆ3/UL (ref 1–4)
LYMPHOCYTES NFR BLD AUTO: 5.4 %
MCH RBC QN AUTO: 30.1 PG (ref 26–34)
MCHC RBC AUTO-ENTMCNC: 33.8 G/DL (ref 31–37)
MCV RBC AUTO: 89.2 FL (ref 80–100)
MIXED CELL %: 5.5 %
NEUTROPHILS # BLD AUTO: 15.4 X10ˆ3/UL (ref 1.5–7.7)
NEUTROPHILS NFR BLD AUTO: 89.1 %
NITRITE UR QL STRIP: NEGATIVE
PH UR STRIP: 5.5 [PH]
PLATELET # BLD AUTO: 316 X10ˆ3/UL (ref 150–450)
POTASSIUM BLD-SCNC: 3.9 MMOL/L (ref 3.6–5.1)
PROT UR STRIP-MCNC: NEGATIVE MG/DL
RBC # BLD AUTO: 4.15 X10ˆ6/UL
SODIUM BLD-SCNC: 137 MMOL/L (ref 136–145)
SP GR UR STRIP: >=1.03
UROBILINOGEN UR STRIP-ACNC: <2 MG/DL
WBC # BLD AUTO: 17.3 X10ˆ3/UL (ref 4–11)

## 2024-01-08 PROCEDURE — 85025 COMPLETE CBC W/AUTO DIFF WBC: CPT | Performed by: PHYSICIAN ASSISTANT

## 2024-01-08 PROCEDURE — 99214 OFFICE O/P EST MOD 30 MIN: CPT

## 2024-01-08 PROCEDURE — 74177 CT ABD & PELVIS W/CONTRAST: CPT | Performed by: PHYSICIAN ASSISTANT

## 2024-01-08 PROCEDURE — 96360 HYDRATION IV INFUSION INIT: CPT

## 2024-01-08 PROCEDURE — 81002 URINALYSIS NONAUTO W/O SCOPE: CPT

## 2024-01-08 PROCEDURE — 81025 URINE PREGNANCY TEST: CPT

## 2024-01-08 PROCEDURE — 80047 BASIC METABLC PNL IONIZED CA: CPT

## 2024-01-08 RX ORDER — SODIUM CHLORIDE 9 MG/ML
1000 INJECTION, SOLUTION INTRAVENOUS ONCE
Status: COMPLETED | OUTPATIENT
Start: 2024-01-08 | End: 2024-01-08

## 2024-01-08 RX ORDER — AMOXICILLIN AND CLAVULANATE POTASSIUM 875; 125 MG/1; MG/1
1 TABLET, FILM COATED ORAL 3 TIMES DAILY
Qty: 21 TABLET | Refills: 0 | Status: SHIPPED | OUTPATIENT
Start: 2024-01-08 | End: 2024-01-15

## 2024-01-08 NOTE — ED PROVIDER NOTES
Chief Complaint   Patient presents with    Abdominal Pain       History obtained from: patient   services not used    HPI:     Aurelia Dean is a 38 year old female who presents with abdominal pain x 2 days.  Patient localizes pain to left lower abdomen without radiation.  Patient states pain is worse after eating.  Patient also notes constipation, last bowel movement 2 days ago was very small.  Patient endorses history of diverticulitis with similar presenting symptoms.  Denies fever, chills, nausea, vomiting, diarrhea, blood in stool, flank pain, urinary symptoms, vaginal bleeding or discharge.    PMH  Past Medical History:   Diagnosis Date    Diverticulitis     High cholesterol     History of pregnancy 2013    Delicia,HALLE, , APGAR:8 (1 min) 9 (5 min)  (10 min)  -2nd degree perineal laceration; Pregnancy management: -Cervidil / Pitocin    Hypercholesterolemia with hypertriglyceridemia     Knee injury     knee cap damage, arthroscopy     Knee pain, left     arthroscopy     Obesity (BMI 30-39.9)     Ruptured ovarian cyst     u/s and resolved    Tubular adenoma of colon 2017       PFSH    PFS asessment screens reviewed and agree.  Nurses notes reviewed I agree with documentation.    Family History   Problem Relation Age of Onset    Heart Disease Mother 45        CAD    Hypertension Mother 45    Diabetes Mother 45        DM    Obesity Mother     Other (multisystem organ failure) Mother 56    Colon Polyps Father     Diabetes Maternal Grandmother     Obesity Maternal Grandmother     Pancreatic Cancer Maternal Uncle 60    Cancer Neg      Family history reviewed with patient/caregiver and is not pertinent to presenting problem.  Social History     Socioeconomic History    Marital status:      Spouse name: Not on file    Number of children: Not on file    Years of education: Not on file    Highest education level: Not on file   Occupational History    Not on file   Tobacco  Use    Smoking status: Never    Smokeless tobacco: Never   Vaping Use    Vaping Use: Never used   Substance and Sexual Activity    Alcohol use: Yes     Comment: socially    Drug use: No    Sexual activity: Yes     Birth control/protection: Vasectomy     Comment: none   Other Topics Concern     Service Not Asked    Blood Transfusions Not Asked    Caffeine Concern Yes     Comment: soda, coffee, 1 cup daily    Occupational Exposure Not Asked    Hobby Hazards Not Asked    Sleep Concern Not Asked    Stress Concern Not Asked    Weight Concern Not Asked    Special Diet Not Asked    Back Care Not Asked    Exercise Not Asked    Bike Helmet Not Asked    Seat Belt Not Asked    Self-Exams Not Asked    Grew up on a farm Not Asked    History of tanning Not Asked    Outdoor occupation Not Asked    Pt has a pacemaker No    Pt has a defibrillator No    Breast feeding Not Asked    Reaction to local anesthetic No   Social History Narrative    Not on file     Social Determinants of Health     Financial Resource Strain: Not on file   Food Insecurity: Not on file   Transportation Needs: Not on file   Physical Activity: Not on file   Stress: Not on file   Social Connections: Not on file   Housing Stability: Not on file         ROS:   Positive for stated complaint: LLQ pain   All other systems reviewed and negative except as noted above.  Constitutional and Vital Signs Reviewed.      Physical Exam:     Findings:    /65   Pulse 87   Temp 98.8 °F (37.1 °C) (Temporal)   Resp 22   LMP 11/04/2023 (Exact Date)   SpO2 99%   GENERAL: well developed, no acute distress, non-toxic appearing   SKIN: good skin turgor, no obvious rashes  HEAD: normocephalic, atraumatic  EYES: sclera non-icteric bilaterally, conjunctiva clear  THROAT: clear, airway patent  NECK: supple, no adenopathy, no nuchal rigidity, no trismus   CARDIO: RRR, normal heart sounds   LUNGS: clear to auscultation bilaterally, no increased WOB  EXTREMITIES: no cyanosis  or edema, LEIGH without difficulty  GI: normoactive bowel sounds, abdomen soft, LLQ tenderness without rebound tenderness or guarding, no CVA tenderness bilaterally   NEURO: no focal deficits  PSYCH: alert and oriented x3.  Answering questions appropriately.  Mood appropriate.    MDM/Assessment/Plan:   Orders for this encounter:    Orders Placed This Encounter    CT ABDOMEN+PELVIS(CONTRAST ONLY)(CPT=74177)     abdominal pain Patient presents with lower abdominal pain since yesterday. Patient denies   dysuria, no fever. Hx of diverticulitis. LBM Saturday, small amount          Order Specific Question:   If clinically indicated, CT Protocol includes Oral Contrast. Can Oral Contrast be administered?     Answer:   Yes     Order Specific Question:   What is the Relevant Clinical Indication / Reason for Exam?     Answer:   LLQ pain, hx of diverticulitis     Order Specific Question:   Release to patient     Answer:   Immediate    POCT CBC     Order Specific Question:   Release to patient     Answer:   Immediate    POCT Urinalysis Dipstick    POCT Pregnancy, Urine    POCT ISTAT chem8 cartridge    POCT Pregnancy, Urine    POCT Urine Dip    iStat (Chem 8)    sodium chloride 0.9% infusion 1,000 mL    iopamidol 76% (ISOVUE-370) injection for power injector    amoxicillin clavulanate 875-125 MG Oral Tab     Sig: Take 1 tablet by mouth in the morning, at noon, and at bedtime for 7 days.     Dispense:  21 tablet     Refill:  0       Labs performed this visit:  Recent Results (from the past 10 hour(s))   POCT Urinalysis Dipstick    Collection Time: 01/08/24  4:04 PM   Result Value Ref Range    Urine Color Yellow Yellow    Urine Clarity Slightly cloudy (A) Clear    Specific Gravity, Urine >=1.030 1.005 - 1.030    PH, Urine 5.5 5.0 - 8.0    Protein urine Negative Negative mg/dL    Glucose, Urine Negative Negative mg/dL    Ketone, Urine Trace (A) Negative mg/dL    Bilirubin, Urine Negative Negative    Blood, Urine Trace-Intact (A)  Negative    Nitrite Urine Negative Negative    Urobilinogen urine <2.0 <2.0 mg/dL    Leukocyte esterase urine Negative Negative   POCT Pregnancy, Urine    Collection Time: 01/08/24  4:11 PM   Result Value Ref Range    POCT Urine Pregnancy Negative Negative   POCT CBC    Collection Time: 01/08/24  4:31 PM   Result Value Ref Range    WBC IC 17.3 (H) 4.0 - 11.0 x10ˆ3/uL    RBC IC 4.15 3.80 - 5.30 X10ˆ6/uL    HGB IC 12.5 12.0 - 16.0 g/dL    HCT IC 37.0 35.0 - 48.0 %    MCV IC 89.2 80.0 - 100.0 fL    MCH IC 30.1 26.0 - 34.0 pg    MCHC IC 33.8 31.0 - 37.0 g/dL    PLT .0 150.0 - 450.0 X10ˆ3/uL    # Neutrophil 15.4 (H) 1.5 - 7.7 X10ˆ3/uL    # Lymphocyte 0.9 (L) 1.0 - 4.0 X10ˆ3/uL    # Mixed Cells 1.0 0.1 - 1.0 X10ˆ3/uL    Neutrophil % 89.1 %    Lymphocyte % 5.4 %    Mixed Cell % 5.5 %   POCT ISTAT chem8 cartridge    Collection Time: 01/08/24  4:45 PM   Result Value Ref Range    ISTAT Sodium 137 136 - 145 mmol/L    ISTAT BUN 14 7 - 18 mg/dL    ISTAT Potassium 3.9 3.6 - 5.1 mmol/L    ISTAT Chloride 102 98 - 112 mmol/L    ISTAT Ionized Calcium 1.17 1.12 - 1.32 mmol/L    ISTAT Hematocrit 39 34 - 50 %    ISTAT Glucose 116 (H) 70 - 99 mg/dL    ISTAT TCO2 24 21 - 32 mmol/L    ISTAT Creatinine 0.80 0.55 - 1.02 mg/dL    eGFR-Cr 95 >=60 mL/min/1.73m2       Imaging performed this visit:  CT ABDOMEN+PELVIS(CONTRAST ONLY)(CPT=74177)   Final Result   PROCEDURE: CT ABDOMEN + PELVIS (CONTRAST ONLY) (CPT=74177)       COMPARISON: [facility], CT ABDOMEN & PELVIS W CON,    12/09/2014, 2:05 AM.  [facility] [place] [facility], CT    ABDOMEN + PELVIS (CPT=74176), 2/18/2017, 1:12 PM.  [facility], CT ABDOMEN PELVIS IV CONTRAST NO ORAL    (ER), 11/01/2023, 9:50 PM.       INDICATIONS: LLQ pain, hx of diverticulitis       TECHNIQUE: CT images of the abdomen and pelvis were obtained with    non-ionic intravenous contrast material.  Automated exposure control for    dose reduction was used.  Adjustment of the mA and/or kV was done based on    the patient's size. Use of iterative    reconstruction technique for dose reduction was used.  Dose information is    transmitted to the ACR (American College of Radiology) NRDR (National    Radiology Data Registry) which includes the Dose Index Registry.       FINDINGS:    LIVER: 15 mm diameter hypodense focus in the medial aspect of right    hepatic lobe, similar to prior imaging.  Liver otherwise has normal size    and contour.   BILIARY: The gallbladder is present.  No intra- or extrahepatic biliary    ductal dilatation.   SPLEEN: No enlargement or focal lesion.     STOMACH: No gastric obstruction.  Duodenum is unremarkable.   PANCREAS: No lesion, fluid collection, ductal dilatation, or atrophy.     ADRENALS: No nodule or enlargement.   KIDNEYS: No enhancing renal lesion or hydroureteronephrosis.   AORTA/VASCULAR:   No aortic aneurysm   RETROPERITONEUM: No mass or enlarged adenopathy.     BOWEL/MESENTERY:  Normal appendix.  No bowel obstruction.  There are    findings compatible with acute diverticulitis in the distal    descending/proximal sigmoid colon.  Prominent pericolonic fat stranding    and free fluid in the vicinity of diverticulitis,    without organized measurable fluid collection.  No free air.  No    lymphadenopathy.   ABDOMINAL WALL: Normal.  No mass or hernia.    URINARY BLADDER: Bladder is incompletely distended.   PELVIC NODES: No enlarged mass or adenopathy.      PELVIC ORGANS: Anteverted uterus with gross satisfactory position of IUD.     Normal CT appearance of the ovaries.   BONES:   Scattered degenerative endplate change and disc disease within    the spine.   LUNG BASES: Minimal dependent subsegmental atelectasis in the visualized    lung bases.  Stable bleb in the right lung base.   OTHER: Negative.                     =====   CONCLUSION:    1.  There are findings compatible with acute diverticulitis in the distal    descending/proximal  sigmoid colon.  There is no evidence of bowel    perforation or organized measurable fluid collection however there is    prominent surrounding fat stranding and free    fluid suggesting phlegmonous change.  Because of these findings recommend    follow-up CT imaging in approximately 7-10 days to assess treatment    response.   2.  Post IUD.   3.  Stable 15 mm diameter hypodense focus in the right hepatic lobe,    likely a cyst or hemangioma as it is stable since 12/2014 imaging.               Dictated by (CST): Gaston Mayo MD on 1/08/2024 at 5:26 PM        Finalized by (CST): Gaston Mayo MD on 1/08/2024 at 5:31 PM                   MDM:  DDx includes diverticulitis vs gastroenteritis versus constipation versus other.  Patient is overall very well-appearing with stable vitals and tolerating oral intake.  CBC reviewed, notable for leukocytosis of 17.3, otherwise grossly unremarkable, no evidence of anemia.  BMP reviewed, grossly unremarkable without evidence of electrolyte derangements or kidney dysfunction.  Urine dipstick analysis notable for trace blood, otherwise unremarkable without evidence of infection.  CT reveals acute diverticulitis in the descending/proximal sigmoid colon, no evidence of perforation or fluid collection however there is fat stranding and free fluid suggestive of phlegmonous change.  Discussed findings with patient.  Rx Augmentin for acute diverticulitis.  Discussed supportive care including rest, liquid to soft diet and OTC Tylenol/ibuprofen as needed for pain.  Discussed importance of follow-up with PCP for repeat CT in 7 to 10 days given phlegmonous changes noted on CT today.  Patient verbalizes understanding.  Instructed patient to go directly to nearest ER with any worsening or concerning symptoms.  Follow-up with PCP.    Discussed case with Dr. Abreu who is in agreement with assessment and plan.     Diagnosis:    ICD-10-CM    1. Acute diverticulitis  K57.92           All results  reviewed and discussed with patient/patient's family. Patient/patient's family verbalize understanding of instructions. All of patient's/patient's family's questions were addressed.   See AVS for detailed discharge instructions for your condition today.    Follow Up with:  Sarah Beth Mckinnon MD  [address]  [place] [address] [phone]    Schedule an appointment as soon as possible for a visit            Tess Joseph PA-C

## 2024-01-08 NOTE — DISCHARGE INSTRUCTIONS
Complete entire course of antibiotic as directed   Alternate Tylenol/Ibuprofen as needed for pain   Liquid to soft diet as tolerated   Drink plenty of fluids  Follow up with primary care doctor for repeat CT in 7-10 days     If you experience severe/worsening pain, inability to drink fluids, fever that cannot be controlled with tylenol/motrin, or any other concerning symptoms, go to nearest ED immediately

## 2024-01-08 NOTE — TELEPHONE ENCOUNTER
I spoke to Aurelia    She was in the ER on 11/01/2023 for a diverticulitis flare    She was given Augmentin and states she was feeling much better after 3-4 days on the antibiotic    Yesterday she could not pass gas and then  that turned into a pressure type pain the the center of her abdomen just below the umbilicus    She is also constipated passing small amounts of stool    She has not tried any OTC medications for this. She does not use any fiber products    Her pain level is 5-6/10.    She is eating a normal diet    She does not have a fever    She had an IUD inserted by Dr Montero on 11/07/2023 and she has not had any issues with the IUD and does not think the pain is from the IUD

## 2024-01-08 NOTE — TELEPHONE ENCOUNTER
Dr Jiménez,    Pt is currently in Immediate Care at [place] as her pain worsened throughout the day

## 2024-01-08 NOTE — TELEPHONE ENCOUNTER
Patient calling states has possible diverticulitis flare up. States is having pressure and abdominal pain. States requesting Dr's advice. Please call.

## 2024-01-08 NOTE — ED INITIAL ASSESSMENT (HPI)
Patient presents with lower abdominal pain since yesterday. Patient denies dysuria, no fever. Hx of diverticulitis. LBM Saturday, small amount

## 2024-01-08 NOTE — TELEPHONE ENCOUNTER
I have an opening tomorrow and would like to see her. Otherwise, she may need urgent/immediate/ER care.     Thanks    MD Bakari [facility] - Gastroenterology  1/8/2024  2:26 PM

## 2024-01-10 ENCOUNTER — OFFICE VISIT (OUTPATIENT)
Dept: SURGERY | Facility: CLINIC | Age: 39
End: 2024-01-10
Payer: COMMERCIAL

## 2024-01-10 ENCOUNTER — PATIENT MESSAGE (OUTPATIENT)
Dept: GASTROENTEROLOGY | Facility: CLINIC | Age: 39
End: 2024-01-10

## 2024-01-10 VITALS — WEIGHT: 232 LBS | BODY MASS INDEX: 37 KG/M2

## 2024-01-10 DIAGNOSIS — K57.32 DIVERTICULITIS OF SIGMOID COLON: ICD-10-CM

## 2024-01-10 DIAGNOSIS — Z86.010 HISTORY OF ADENOMATOUS POLYP OF COLON: Primary | ICD-10-CM

## 2024-01-10 PROCEDURE — 99244 OFF/OP CNSLTJ NEW/EST MOD 40: CPT | Performed by: SURGERY

## 2024-01-10 NOTE — TELEPHONE ENCOUNTER
From: Aurelia Dean  To: Matt Jiménez  Sent: 1/10/2024 2:19 PM CST  Subject: CT scan order - follow up from 1/8 urgent care     Hi Dr Jiménez, just following up on the request from urgent care as they wanted a CT scan ordered for 7-10 days out from my visit on 1/8/23.    Thanks    Aurelia Dean

## 2024-01-10 NOTE — H&P
Chief complaint:   Chief Complaint   Patient presents with    Diverticulitis     Referred by Dr. Jiménez for diverticulitis.       HPI: Aurelia has had 5-6 episodes of acute sigmoid/desc colon diverticulitis, the last two in 2023 and then 2024. She is presently on Augmentin. Dr. Jiménez performed colonoscopy and removed a hyperplastic polyp.     She has no F/C. Her pain is improving but definitely still present.     She has 2 Bms daily, usually firm, occasional constipation. No BRBPR.     Past medical history:   Past Medical History:   Diagnosis Date    Diverticulitis     High cholesterol     History of pregnancy 2013    Delicia,HALLE, , APGAR:8 (1 min) 9 (5 min)  (10 min)  -2nd degree perineal laceration; Pregnancy management: -Cervidil / Pitocin    Hypercholesterolemia with hypertriglyceridemia     Knee injury     knee cap damage, arthroscopy     Knee pain, left     arthroscopy     Obesity (BMI 30-39.9)     Ruptured ovarian cyst     u/s and resolved    Tubular adenoma of colon 2017       Past surgical history:   Past Surgical History:   Procedure Laterality Date    COLONOSCOPY  2017    COLONOSCOPY N/A 2017    Procedure: COLONOSCOPY;  Surgeon: Matt Jiménez MD;  Location: [facility] ENDOSCOPY    COLONOSCOPY  2023    KNEE ARTHROSCOPY  ,     knee cap damage; L knee pain    NEEDLE BIOPSY RIGHT  10/02/2023    2 site ultrasound guided    OTHER SURGICAL HISTORY      L orthoscopic knee       Allergies: No Known Allergies    Medications:   Current Outpatient Medications   Medication Sig Dispense Refill    amoxicillin clavulanate 875-125 MG Oral Tab Take 1 tablet by mouth in the morning, at noon, and at bedtime for 7 days. 21 tablet 0    acidophilus-pectin Oral Cap Take 1 capsule by mouth daily.      Phentermine HCl 15 MG Oral Cap Take 1 capsule (15 mg total) by mouth every morning. 30 capsule 2    topiramate 25 MG Oral Tab Take 1 tablet (25 mg total) by mouth 2  (two) times daily. Start 1 tab daily for 7 days, then can increase to twice a day as prescribed 60 tablet 2    Testosterone Does not apply Powder       Ascorbic Acid (VITAMIN C) 100 MG Oral Tab Take 1 tablet (100 mg total) by mouth daily.      Ergocalciferol (VITAMIN D OR) Take 1 tablet by mouth daily.      Omega-3 Fatty Acids (FISH OIL OR) Take by mouth daily.         Social history:   Social History     Socioeconomic History    Marital status:    Tobacco Use    Smoking status: Never    Smokeless tobacco: Never   Vaping Use    Vaping Use: Never used   Substance and Sexual Activity    Alcohol use: Yes     Comment: socially    Drug use: No    Sexual activity: Yes     Birth control/protection: Vasectomy     Comment: none        Family history:  Family History   Problem Relation Age of Onset    Heart Disease Mother 45        CAD    Hypertension Mother 45    Diabetes Mother 45        DM    Obesity Mother     Other (multisystem organ failure) Mother 56    Colon Polyps Father     Diabetes Maternal Grandmother     Obesity Maternal Grandmother     Pancreatic Cancer Maternal Uncle 60    Cancer Neg         Review of Systems:   GENERAL: feels generally well  SKIN: no ulcerated or worrisome skin lesions  EYES:denies blurred vision or double vision  HEENT: denies new nasal congestion, sinus pain or ST  LUNGS: denies shortness of breath with exertion  CARDIOVASCULAR: denies chest pain on exertion  GI: no hematemesis, no BRBPR, no worsening heartburn  : no dysuria, no blood in urine, no difficulty urinating  MUSCULOSKELETAL: no new musculoskeletal complaints  NEURO: no persistent, recurrent  headaches  PSYCHE:no depression or anxiety  HEMATOLOGIC: no hx of blood dyscrasia  ENDOCRINE: no new endocrine problems  ALL/ASTHMA: no new hx of severe allergy or asthma  BACK: normal, no spinal deformity, no CVA tenderness    Physical examination:     Constitutional: appears well hydrated alert and responsive no acute distress  noted  HEENT wnl, anicteric, PERRL, normocephalic, atraumatic  Neck supple, norm ROM, no JVD  L CTA B  H Reg rate  Abd soft, LLQ T without rebound or guarding, ND, no masses, no hernias, no HSM.  Extr no c/c/e  Skin intact, no jaundice, no rashes, no lesions  Neuro grossly intact, no focal deficits, no tremors  Back no deformity, no CVA tnd.         Assessment and plan:  Diagnoses and all orders for this visit:    History of adenomatous polyp of colon    Diverticulitis of sigmoid colon       Continue abx as ordered, may need to change ot cipro/flagyl. Follow clinical exam. Plan Laparoscopic colon resection. Timing TBD, plan surgery 6 weeks or more after symptoms resolve.     We discussed the ddx, natural hx, diet, recurrence of diverticultis,  surgical risks, benefits, alternatives, and expected recovery. All of the patient's questions have been answered to her satisfaction.      Apryl Nguyen MD  1/10/2024  12:42 PM

## 2024-01-11 NOTE — TELEPHONE ENCOUNTER
Patient confirmed appt.    Your appointments       Date & Time Appointment Department ([place])    Jan 18, 2024  9:00 AM CST Follow Up Visit with Matt Jiménez MD [facility] ([facility])

## 2024-01-11 NOTE — TELEPHONE ENCOUNTER
Mychart sent.     If patient called back please offer/confirm appt on 1/18 at 9am [facility]. Ok to add to res24.

## 2024-01-11 NOTE — TELEPHONE ENCOUNTER
GI staff    I would prefer to see her in the office first. It may be too soon to do the CT at that time. Can offer her appointment with me 24 hour res Thursday 1/18 please    Thanks    MD Bakari [facility] - Gastroenterology  1/11/2024  1:05 PM     Below Umbilicus

## 2024-01-17 NOTE — H&P
[facility] - Gastroenterology                                                                                                  Clinic History and Physical       Referring provider: Cathy Garrison    Chief Complaint   Patient presents with    Follow - Up     HPI:   Aurelia Dean is a 38 year old woman with history of BMI 38, hypercholesterolemia, stress, depression with anxiety, skin neoplasm, knee surgery, diverticulitis here for diverticulitis    Says today she feels resolved. No further abdominal pain. Just finished antibiotic course (augmentin). Bowels are getting back to normal. Tolerating diet and slowly reintroducing foods. No bleeding. No nausea/vomiting. Notes this as her 7th episode of diverticulitis.     History, Medications, Allergies, ROS:      Past Medical History:   Diagnosis Date    Diverticulitis     High cholesterol     History of pregnancy 2013    HALLE Nesbitt, , APGAR:8 (1 min) 9 (5 min)  (10 min)  -2nd degree perineal laceration; Pregnancy management: -Cervidil / Pitocin    Hypercholesterolemia with hypertriglyceridemia     Knee injury     knee cap damage, arthroscopy     Knee pain, left     arthroscopy     Obesity (BMI 30-39.9)     Ruptured ovarian cyst     u/s and resolved    Tubular adenoma of colon 2017      Past Surgical History:   Procedure Laterality Date    COLONOSCOPY  2017    COLONOSCOPY N/A 2017    Procedure: COLONOSCOPY;  Surgeon: Matt Jiménez MD;  Location: [facility] ENDOSCOPY    COLONOSCOPY  2023    KNEE ARTHROSCOPY  ,     knee cap damage; L knee pain    NEEDLE BIOPSY RIGHT  10/02/2023    2 site ultrasound guided    OTHER SURGICAL HISTORY      L orthoscopic knee      Family Hx:   Family History   Problem Relation Age of Onset    Heart Disease Mother 45        CAD    Hypertension Mother 45    Diabetes Mother 45        DM     Obesity Mother     Other (multisystem organ failure) Mother 56    Colon Polyps Father     Diabetes Maternal Grandmother     Obesity Maternal Grandmother     Pancreatic Cancer Maternal Uncle 60    Cancer Neg       Social History:   Social History     Socioeconomic History    Marital status:    Tobacco Use    Smoking status: Never    Smokeless tobacco: Never   Vaping Use    Vaping Use: Never used   Substance and Sexual Activity    Alcohol use: Yes     Comment: socially    Drug use: No    Sexual activity: Yes     Birth control/protection: Vasectomy     Comment: none   Other Topics Concern    Caffeine Concern Yes     Comment: soda, coffee, 1 cup daily    Pt has a pacemaker No    Pt has a defibrillator No    Reaction to local anesthetic No        Medications (Active prior to today's visit):  Current Outpatient Medications   Medication Sig Dispense Refill    acidophilus-pectin Oral Cap Take 1 capsule by mouth daily.      Phentermine HCl 15 MG Oral Cap Take 1 capsule (15 mg total) by mouth every morning. 30 capsule 2    topiramate 25 MG Oral Tab Take 1 tablet (25 mg total) by mouth 2 (two) times daily. Start 1 tab daily for 7 days, then can increase to twice a day as prescribed 60 tablet 2    Testosterone Does not apply Powder       Ascorbic Acid (VITAMIN C) 100 MG Oral Tab Take 1 tablet (100 mg total) by mouth daily.      Ergocalciferol (VITAMIN D OR) Take 1 tablet by mouth daily.      Omega-3 Fatty Acids (FISH OIL OR) Take by mouth daily.       Allergies:  No Known Allergies    ROS:   Systems were reviewed and were negative except as noted in the HPI    PHYSICAL EXAM:   Blood pressure 136/86, height 5' 6\" (1.676 m), weight 231 lb (104.8 kg), last menstrual period 11/04/2023, not currently breastfeeding.    General:awake, cooperative, no acute distress  HEENT: EOMI, no scleral icterus, MMM; oral pharnyx is without exudates or lesions  Neck: no lymphadenopathy; thyroid is not enlarged and without nodules  CV:  RRR  Resp: non-labored breathing  Abd: soft, non-tender, non-distended  Ext: no lower extremity swelling  Neuro: Alert, Oriented X 3  Skin: no rashes, bruises  Psych: normal affect    Labs/Imaging:     Reviewed as noted in the HPI and A/P    ASSESSMENT/PLAN:   Aurelia Dean is a 38 year old woman with history of BMI 38, hypercholesterolemia, stress, depression with anxiety, skin neoplasm, knee surgery, diverticulitis here for diverticulitis    The patient was last seen in the office in December 2018. She underwent a colonoscopy in September 2017 for history of diverticulitis which occurred in February 2017 with findings of good prep, 5 mm sessile transverse colon adenoma, mild sigmoid colon diverticulosis. She underwent a surveillance colonoscopy January 2023 for history of adenomatous colon polyps with findings of a diminutive colon polyp (hyperplastic), diverticulosis and hemorrhoids. Since then, she is noted to have been in the ED November 2023 for abdominal pain with a CT A/P 11/1/23 showing acute uncomplicated descending colon diverticulitis. She returned to the ED 1/8/24 for abdominal pain with findings noted of acute diverticulitis in the left colon and suggestion of phlegmonous change. She was discharged by the ED provider. She saw general surgeon Dr. Nguyen 1/10/24.    Discussed that at this time her most recent episode of diverticulitis seems clinically resolved. Discussed if any recurrent pain, I do recommend another CT scan and she can call the office. We discussed she has had multiple documented episodes of diverticulitis and at this point reasonable to consider surgery.    Recommend:  -advance diet as tolerated  -consider interval CT pending clinical course/recurrence of symptoms  -follow up with general surgery/Dr. Nguyen      Orders This Visit:  No orders of the defined types were placed in this encounter.    Meds This Visit:  Requested Prescriptions      No prescriptions requested or ordered in  this encounter     Imaging & Referrals:  None     Matt Jiménez MD  [facility] - Gastroenterology  1/18/2024

## 2024-01-18 ENCOUNTER — OFFICE VISIT (OUTPATIENT)
Dept: GASTROENTEROLOGY | Facility: CLINIC | Age: 39
End: 2024-01-18
Payer: COMMERCIAL

## 2024-01-18 VITALS
WEIGHT: 231 LBS | SYSTOLIC BLOOD PRESSURE: 136 MMHG | DIASTOLIC BLOOD PRESSURE: 86 MMHG | HEIGHT: 66 IN | BODY MASS INDEX: 37.12 KG/M2

## 2024-01-18 DIAGNOSIS — R93.5 ABNORMAL CT OF THE ABDOMEN: ICD-10-CM

## 2024-01-18 DIAGNOSIS — R93.5 ABNORMAL CT SCAN, PELVIS: ICD-10-CM

## 2024-01-18 DIAGNOSIS — K57.32 DIVERTICULITIS OF LARGE INTESTINE WITHOUT PERFORATION OR ABSCESS, UNSPECIFIED BLEEDING STATUS: Primary | ICD-10-CM

## 2024-01-18 PROCEDURE — 3008F BODY MASS INDEX DOCD: CPT | Performed by: INTERNAL MEDICINE

## 2024-01-18 PROCEDURE — 3075F SYST BP GE 130 - 139MM HG: CPT | Performed by: INTERNAL MEDICINE

## 2024-01-18 PROCEDURE — 3079F DIAST BP 80-89 MM HG: CPT | Performed by: INTERNAL MEDICINE

## 2024-01-18 PROCEDURE — 99213 OFFICE O/P EST LOW 20 MIN: CPT | Performed by: INTERNAL MEDICINE

## 2024-02-08 NOTE — DISCHARGE INSTRUCTIONS
Thank you for visiting our immediate care for your health care needs. Please follow up with your regular doctor in the next 1-2 days. If you have any additional problems please return to the immediate care. Please take Tylenol and or Motrin for pain and fevers. 100

## 2024-03-11 ENCOUNTER — OFFICE VISIT (OUTPATIENT)
Dept: SURGERY | Facility: CLINIC | Age: 39
End: 2024-03-11
Payer: COMMERCIAL

## 2024-03-11 VITALS — HEIGHT: 66 IN | WEIGHT: 231 LBS | BODY MASS INDEX: 37.12 KG/M2

## 2024-03-11 DIAGNOSIS — K57.32 DIVERTICULITIS OF SIGMOID COLON: Primary | ICD-10-CM

## 2024-03-11 DIAGNOSIS — Z86.010 HISTORY OF ADENOMATOUS POLYP OF COLON: ICD-10-CM

## 2024-03-11 PROCEDURE — 3008F BODY MASS INDEX DOCD: CPT | Performed by: SURGERY

## 2024-03-11 PROCEDURE — 99214 OFFICE O/P EST MOD 30 MIN: CPT | Performed by: SURGERY

## 2024-03-11 NOTE — H&P
Chief complaint:   Chief Complaint   Patient presents with    Diverticulitis     Follow up diverticulitis       HPI: Aurelia has had 6 or more episodes of acute sigmoid/desc colon diverticulitis, the last two in 2023 and then 2024. She is presently on Augmentin. Dr. Jiménez performed colonoscopy and removed a hyperplastic polyp.     She has no F/C. Her pain is improving.    She has historically 2 Bms daily, usually firm, occasional constipation. No BRBPR. She is trying to keep up with hydration and is taking benfiber.     CT abd 2024  CONCLUSION:  1.  There are findings compatible with acute diverticulitis in the distal descending/proximal sigmoid colon.  There is no evidence of bowel perforation or organized measurable fluid collection however there is prominent surrounding fat stranding and free   fluid suggesting phlegmonous change.  Because of these findings recommend follow-up CT imaging in approximately 7-10 days to assess treatment response.  2.  Post IUD.  3.  Stable 15 mm diameter hypodense focus in the right hepatic lobe, likely a cyst or hemangioma as it is stable since 2014 imaging.       Past medical history:   Past Medical History:   Diagnosis Date    Diverticulitis     High cholesterol     History of pregnancy 2013    Delicia,HALLE, , APGAR:8 (1 min) 9 (5 min)  (10 min)  -2nd degree perineal laceration; Pregnancy management: -Cervidil / Pitocin    Hypercholesterolemia with hypertriglyceridemia     Knee injury     knee cap damage, arthroscopy     Knee pain, left     arthroscopy     Obesity (BMI 30-39.9)     Ruptured ovarian cyst     u/s and resolved    Tubular adenoma of colon 2017       Past surgical history:   Past Surgical History:   Procedure Laterality Date    COLONOSCOPY  2017    COLONOSCOPY N/A 2017    Procedure: COLONOSCOPY;  Surgeon: Matt Jiménez MD;  Location: [facility] ENDOSCOPY    COLONOSCOPY  2023    KNEE ARTHROSCOPY  ,      knee cap damage; L knee pain    NEEDLE BIOPSY RIGHT  10/02/2023    2 site ultrasound guided    OTHER SURGICAL HISTORY  1998    L orthoscopic knee       Allergies: No Known Allergies    Medications:   Current Outpatient Medications   Medication Sig Dispense Refill    acidophilus-pectin Oral Cap Take 1 capsule by mouth daily.      Phentermine HCl 15 MG Oral Cap Take 1 capsule (15 mg total) by mouth every morning. 30 capsule 2    topiramate 25 MG Oral Tab Take 1 tablet (25 mg total) by mouth 2 (two) times daily. Start 1 tab daily for 7 days, then can increase to twice a day as prescribed 60 tablet 2    Testosterone Does not apply Powder       Ascorbic Acid (VITAMIN C) 100 MG Oral Tab Take 1 tablet (100 mg total) by mouth daily.      Ergocalciferol (VITAMIN D OR) Take 1 tablet by mouth daily.      Omega-3 Fatty Acids (FISH OIL OR) Take by mouth daily.         Social history:   Social History     Socioeconomic History    Marital status:    Tobacco Use    Smoking status: Never    Smokeless tobacco: Never   Vaping Use    Vaping Use: Never used   Substance and Sexual Activity    Alcohol use: Yes     Comment: socially    Drug use: No    Sexual activity: Yes     Birth control/protection: Vasectomy     Comment: none        Family history:  Family History   Problem Relation Age of Onset    Heart Disease Mother 45        CAD    Hypertension Mother 45    Diabetes Mother 45        DM    Obesity Mother     Other (multisystem organ failure) Mother 56    Colon Polyps Father     Diabetes Maternal Grandmother     Obesity Maternal Grandmother     Pancreatic Cancer Maternal Uncle 60    Cancer Neg         Review of Systems:   GENERAL: feels generally well  SKIN: no ulcerated or worrisome skin lesions  EYES:denies blurred vision or double vision  HEENT: denies new nasal congestion, sinus pain or ST  LUNGS: denies shortness of breath with exertion  CARDIOVASCULAR: denies chest pain on exertion  GI: no hematemesis, no BRBPR, no  worsening heartburn  : no dysuria, no blood in urine, no difficulty urinating  MUSCULOSKELETAL: no new musculoskeletal complaints  NEURO: no persistent, recurrent  headaches  PSYCHE:no depression or anxiety  HEMATOLOGIC: no hx of blood dyscrasia  ENDOCRINE: no new endocrine problems  ALL/ASTHMA: no new hx of severe allergy or asthma  BACK: normal, no spinal deformity, no CVA tenderness    Physical examination:     Constitutional: appears well hydrated alert and responsive no acute distress noted  HEENT wnl, anicteric, PERRL, normocephalic, atraumatic  Neck supple, norm ROM, no JVD  L CTA B  H Reg rate  Abd soft, LLQ T without rebound or guarding, ND, no masses, no hernias, no HSM.  Extr no c/c/e  Skin intact, no jaundice, no rashes, no lesions  Neuro grossly intact, no focal deficits, no tremors  Back no deformity, no CVA tnd.         Assessment and plan:  Diagnoses and all orders for this visit:    Diverticulitis of sigmoid colon  -     CT ABDOMEN+PELVIS(CONTRAST ONLY)(CPT=74177); Future    History of adenomatous polyp of colon       Continue to follow clinical exam. Plan Laparoscopic colon resection. Timing TBD, plan surgery 6 weeks or more after symptoms resolve. Will repeat CT in 4-6 weeks.    We discussed the ddx, natural hx, diet, recurrence of diverticultis,  surgical risks, benefits, alternatives, and expected recovery. All of the patient's questions have been answered to her satisfaction.      Apryl Nguyen MD

## 2024-04-03 ENCOUNTER — HOSPITAL ENCOUNTER (OUTPATIENT)
Dept: MAMMOGRAPHY | Facility: HOSPITAL | Age: 39
Discharge: HOME | End: 2024-04-03
Attending: OBSTETRICS & GYNECOLOGY
Payer: COMMERCIAL

## 2024-04-03 DIAGNOSIS — R92.8 ABNORMAL MAMMOGRAM OF RIGHT BREAST: ICD-10-CM

## 2024-04-03 PROCEDURE — 77061 BREAST TOMOSYNTHESIS UNI: CPT | Performed by: OBSTETRICS & GYNECOLOGY

## 2024-04-03 PROCEDURE — 77065 DX MAMMO INCL CAD UNI: CPT | Performed by: OBSTETRICS & GYNECOLOGY

## 2024-04-15 ENCOUNTER — HOSPITAL ENCOUNTER (OUTPATIENT)
Age: 39
Discharge: HOME | End: 2024-04-15
Payer: COMMERCIAL

## 2024-04-15 VITALS
RESPIRATION RATE: 20 BRPM | DIASTOLIC BLOOD PRESSURE: 76 MMHG | OXYGEN SATURATION: 98 % | SYSTOLIC BLOOD PRESSURE: 151 MMHG | TEMPERATURE: 98 F | HEART RATE: 88 BPM

## 2024-04-15 DIAGNOSIS — J01.01 ACUTE RECURRENT MAXILLARY SINUSITIS: Primary | ICD-10-CM

## 2024-04-15 LAB — SARS-COV-2 RNA RESP QL NAA+PROBE: NOT DETECTED

## 2024-04-15 PROCEDURE — 99213 OFFICE O/P EST LOW 20 MIN: CPT

## 2024-04-15 RX ORDER — AMOXICILLIN AND CLAVULANATE POTASSIUM 875; 125 MG/1; MG/1
1 TABLET, FILM COATED ORAL 2 TIMES DAILY
Qty: 14 TABLET | Refills: 0 | Status: SHIPPED | OUTPATIENT
Start: 2024-04-15 | End: 2024-04-22

## 2024-04-15 NOTE — ED PROVIDER NOTES
Patient Seen in: [facility]      History     Chief Complaint   Patient presents with    Sinus Problem     Stated Complaint: Sinus infection    Subjective:   38-year-old female with recurrent sinusitis, high cholesterol, diverticulitis presents from home with complaints of sinus symptoms.  Onset about 6 days ago.  Having sinus congestion with pressure.  Some thick nasal drainage.  Had some blood when blowing her nose.  No fever.  She has been taking ibuprofen and Robitussin DM at home.  No COVID testing done.  She is a non-smoker.  States she gets sinus infections annually, does follow with ENT    The history is provided by the patient. No  was used.       Objective:   Past Medical History:    Diverticulitis    High cholesterol    History of pregnancy    Delicia,JLK, , APGAR:8 (1 min) 9 (5 min)  (10 min)  -2nd degree perineal laceration; Pregnancy management: -Cervidil / Pitocin    Hypercholesterolemia with hypertriglyceridemia    Knee injury    knee cap damage, arthroscopy     Knee pain, left    arthroscopy     Obesity (BMI 30-39.9)    Ruptured ovarian cyst    u/s and resolved    Tubular adenoma of colon              No pertinent past surgical history.              No pertinent social history.            Review of Systems    Positive for stated complaint: Sinus infection  Other systems are as noted in HPI.  Constitutional and vital signs reviewed.      All other systems reviewed and negative except as noted above.    Physical Exam     ED Triage Vitals [04/15/24 0956]   /76   Pulse 88   Resp 20   Temp 97.9 °F (36.6 °C)   Temp src Temporal   SpO2 98 %   O2 Device None (Room air)       Current:/76   Pulse 88   Temp 97.9 °F (36.6 °C) (Temporal)   Resp 20   LMP 2024 (Exact Date)   SpO2 98%         Physical Exam  Vitals and nursing note reviewed.   Constitutional:       General: She is not in acute distress.     Appearance: Normal appearance. She is not  ill-appearing or toxic-appearing.   HENT:      Head: Normocephalic and atraumatic.      Right Ear: Tympanic membrane, ear canal and external ear normal.      Left Ear: Tympanic membrane, ear canal and external ear normal.      Nose: Congestion present. No mucosal edema.      Right Nostril: No epistaxis.      Left Nostril: No epistaxis.      Right Turbinates: Not enlarged.      Left Turbinates: Not enlarged.      Right Sinus: No maxillary sinus tenderness or frontal sinus tenderness.      Left Sinus: No maxillary sinus tenderness or frontal sinus tenderness.      Mouth/Throat:      Mouth: Mucous membranes are moist.      Pharynx: Oropharynx is clear. No pharyngeal swelling or posterior oropharyngeal erythema.      Tonsils: No tonsillar exudate.   Eyes:      Pupils: Pupils are equal, round, and reactive to light.   Cardiovascular:      Rate and Rhythm: Normal rate and regular rhythm.      Pulses: Normal pulses.   Pulmonary:      Effort: Pulmonary effort is normal. No respiratory distress.      Breath sounds: Normal breath sounds.      Comments: Lungs clear.  No adventitious lung sounds.  No distress.  No hypoxia.  Pulse ox 98% ra. Which is normal    Abdominal:      General: Abdomen is flat.      Palpations: Abdomen is soft.   Musculoskeletal:         General: No signs of injury. Normal range of motion.      Cervical back: Normal range of motion and neck supple.   Lymphadenopathy:      Cervical: No cervical adenopathy.   Skin:     General: Skin is warm and dry.      Capillary Refill: Capillary refill takes less than 2 seconds.   Neurological:      General: No focal deficit present.      Mental Status: She is alert and oriented to person, place, and time.      GCS: GCS eye subscore is 4. GCS verbal subscore is 5. GCS motor subscore is 6.   Psychiatric:         Mood and Affect: Mood normal.         Behavior: Behavior normal.         Thought Content: Thought content normal.         Judgment: Judgment normal.           ED  Course     Labs Reviewed   RAPID SARS-COV-2 BY PCR - Normal     Recent Results (from the past 24 hour(s))   Rapid SARS-CoV-2 by PCR    Collection Time: 04/15/24 10:07 AM    Specimen: Nares; Other   Result Value Ref Range    Rapid SARS-CoV-2 by PCR Not Detected Not Detected     “PODS” clinical criteria   Suggest ABRS with two or more of  Facial pain/pressure/fullness  Nasal obstruction  Nasal purulence/discoloured postnasal discharge  Decreased/absent smell   that persist for more than 7-10 days     UpToDate Notes - Systematic reviews and meta-analyses have found that many patients with clinically diagnosed ABRS improve without antibiotic therapy within two weeks. A 2014 systematic review of randomized trials in immunocompetent patients with maxillary sinusitis found that 80 percent of patients not treated with antibiotics improved within two weeks. A 2018 systematic review of 15 randomized trials including over 3000 immunocompetent patients with uncomplicated ARS found that nearly half of patients improved by one week, and two-thirds by two weeks, irrespective of antibiotic therapy. Additionally, compared with placebo, patients who receive antibiotics have more adverse events    MDM        Medical Decision Making  Differential diagnosis: Viral versus bacterial sinusitis, COVID  COVID test negative  Patient having some nasal congestion but nontoxic-appearing on exam.  No significant facial tenderness or swelling.  No fever.  Does not meet pods clinical criteria for bacterial sinusitis.  Patient does note history of recurrent sinusitis and this feels the same  Recommend trialing over-the-counter medications for viral sinusitis -Flonase, Afrin, saline nasal spray, sinus wash.  If no improvement she may start Augmentin    Results and plan of care discussed with the patient/family. They are in agreement with discharge. They understand to follow up with their primary doctor or the referral physician for further  evaluation, especially if no improvement.  Also discussed the limitations of immediate care, patient is aware that if symptoms are worse they should go to the emergency room. Verbal and written discharge instructions were given.       Problems Addressed:  Acute recurrent maxillary sinusitis: acute illness or injury    Amount and/or Complexity of Data Reviewed  Labs: ordered. Decision-making details documented in ED Course.    Risk  OTC drugs.  Prescription drug management.        Disposition and Plan     Clinical Impression:  1. Acute recurrent maxillary sinusitis         Disposition:  Discharge  4/15/2024 10:30 am    Follow-up:  Sarah Beth Mckinnon MD  [address]  [place] [address]  [phone]                Medications Prescribed:  Discharge Medication List as of 4/15/2024 10:31 AM        START taking these medications    Details   amoxicillin clavulanate 875-125 MG Oral Tab Take 1 tablet by mouth 2 (two) times daily for 7 days., Normal, Disp-14 tablet, R-0

## 2024-04-15 NOTE — DISCHARGE INSTRUCTIONS
COVID test is negative.  Start an antihistamine like Flonase.  Use Afrin nasal spray.  Saline nasal spray.  Sinus rinses.  Warm steam to your face.  Continue ibuprofen as needed for pain.  If no improvement start Augmentin, follow-up with ENT

## 2024-04-15 NOTE — ED INITIAL ASSESSMENT (HPI)
Patient reports yearly sinus infections around this time of year.  6 day hx of sinus congestion, pain and pressure.  Reports thick nasal discharge and an episode of epistaxis today.

## 2024-04-27 ENCOUNTER — HOSPITAL ENCOUNTER (OUTPATIENT)
Age: 39
Discharge: HOME | End: 2024-04-27
Payer: COMMERCIAL

## 2024-04-27 VITALS
DIASTOLIC BLOOD PRESSURE: 68 MMHG | TEMPERATURE: 98 F | OXYGEN SATURATION: 98 % | HEART RATE: 78 BPM | RESPIRATION RATE: 18 BRPM | SYSTOLIC BLOOD PRESSURE: 131 MMHG

## 2024-04-27 DIAGNOSIS — J01.00 ACUTE MAXILLARY SINUSITIS, RECURRENCE NOT SPECIFIED: Primary | ICD-10-CM

## 2024-04-27 PROCEDURE — 99213 OFFICE O/P EST LOW 20 MIN: CPT

## 2024-04-27 PROCEDURE — 99214 OFFICE O/P EST MOD 30 MIN: CPT

## 2024-04-27 RX ORDER — AMOXICILLIN AND CLAVULANATE POTASSIUM 875; 125 MG/1; MG/1
1 TABLET, FILM COATED ORAL 2 TIMES DAILY
Qty: 20 TABLET | Refills: 0 | Status: SHIPPED | OUTPATIENT
Start: 2024-04-27 | End: 2024-05-07

## 2024-04-27 NOTE — ED INITIAL ASSESSMENT (HPI)
Seen here 4/15 for sinusitis. States no improvement. Now with sore throat and bilateral ear pain for 1 week. No fever.

## 2024-04-27 NOTE — ED PROVIDER NOTES
Patient Seen in: [facility]      History     Chief Complaint   Patient presents with    Sinus Problem    Sore Throat     Stated Complaint: Ear and Throat Pain    Subjective:   HPI    38-year-old female presenting for evaluation of for evaluation of sore throat, ear pain, sinus congestion.  Patient was seen 2 weeks ago at this facility for sinus congestion, facial pressure.  Treated with OTC medications, Flonase with some improvement in the symptoms.  Now for the last few days patient with throat discomfort, ear fullness and continued sinus congestion.  No new fevers/chills.  Patient taking ibuprofen only for the symptoms at this time    Objective:   Past Medical History:    Diverticulitis    High cholesterol    History of pregnancy    Delicia,HALLE, , APGAR:8 (1 min) 9 (5 min)  (10 min)  -2nd degree perineal laceration; Pregnancy management: -Cervidil / Pitocin    Hypercholesterolemia with hypertriglyceridemia    Knee injury    knee cap damage, arthroscopy     Knee pain, left    arthroscopy     Obesity (BMI 30-39.9)    Ruptured ovarian cyst    u/s and resolved    Tubular adenoma of colon              Past Surgical History:   Procedure Laterality Date    Colonoscopy  2017    Colonoscopy N/A 2017    Procedure: COLONOSCOPY;  Surgeon: Matt Jiménez MD;  Location: [facility] ENDOSCOPY    Colonoscopy  2023    Knee arthroscopy  ,     knee cap damage; L knee pain    Needle biopsy right  10/02/2023    2 site ultrasound guided    Other surgical history      L orthoscopic knee                Social History     Socioeconomic History    Marital status:    Tobacco Use    Smoking status: Never    Smokeless tobacco: Never   Vaping Use    Vaping status: Never Used   Substance and Sexual Activity    Alcohol use: Yes     Comment: socially    Drug use: No    Sexual activity: Yes     Birth control/protection: Vasectomy     Comment: none   Other Topics Concern    Caffeine Concern  Yes     Comment: soda, coffee, 1 cup daily    Pt has a pacemaker No    Pt has a defibrillator No    Reaction to local anesthetic No              Review of Systems    Positive for stated complaint: Ear and Throat Pain  Other systems are as noted in HPI.  Constitutional and vital signs reviewed.      All other systems reviewed and negative except as noted above.    Physical Exam     ED Triage Vitals [04/27/24 0818]   /68   Pulse 78   Resp 18   Temp 97.8 °F (36.6 °C)   Temp src Temporal   SpO2 98 %   O2 Device None (Room air)       Current:/68   Pulse 78   Temp 97.8 °F (36.6 °C) (Temporal)   Resp 18   LMP 04/01/2024 (Exact Date)   SpO2 98%         Physical Exam  Vitals and nursing note reviewed.   Constitutional:       General: She is not in acute distress.  HENT:      Head: Normocephalic and atraumatic.      Right Ear: External ear normal.      Left Ear: External ear normal.      Nose: Nose normal.      Mouth/Throat:      Mouth: Mucous membranes are moist.   Eyes:      Extraocular Movements: Extraocular movements intact.      Pupils: Pupils are equal, round, and reactive to light.   Cardiovascular:      Rate and Rhythm: Normal rate.   Pulmonary:      Effort: Pulmonary effort is normal.   Abdominal:      General: Abdomen is flat.   Musculoskeletal:         General: Normal range of motion.      Cervical back: Normal range of motion.   Skin:     General: Skin is warm.   Neurological:      General: No focal deficit present.      Mental Status: She is alert and oriented to person, place, and time.   Psychiatric:         Mood and Affect: Mood normal.         Behavior: Behavior normal.               ED Course   Labs Reviewed - No data to display     38-year-old female presenting for evaluation of sinus congestion, facial pressure.  Additionally with sore throat and ear discomfort for the last several days.  Patient afebrile.  Ddx-allergic rhinitis, sinusitis, viral illness  I do suspect there is a allergic  rhinitis component to the patient's symptoms and have recommended she resume Flonase, Allegra.  Given the duration of the symptoms it is reasonable to treat for sinus infection.  Augmentin Rx to pharmacy.  Supportive care, anticipatory guidance reviewed              MDM                                         Medical Decision Making      Disposition and Plan     Clinical Impression:  1. Acute maxillary sinusitis, recurrence not specified         Disposition:  Discharge  4/27/2024  9:11 am    Follow-up:  Sarah Beth Mckinnon MD  [address]  [place] [address]  [phone]                Medications Prescribed:  Discharge Medication List as of 4/27/2024  9:12 AM

## 2024-07-22 ENCOUNTER — OFFICE VISIT (OUTPATIENT)
Facility: LOCATION | Age: 39
End: 2024-07-22
Payer: COMMERCIAL

## 2024-07-22 VITALS — TEMPERATURE: 97 F | HEART RATE: 60 BPM

## 2024-07-22 DIAGNOSIS — K57.32 DIVERTICULITIS OF SIGMOID COLON: Primary | ICD-10-CM

## 2024-07-22 NOTE — H&P
New Patient Visit Note       Active Problems      1. Diverticulitis of sigmoid colon        Chief Complaint   Chief Complaint   Patient presents with    New Patient     NP- Diverticulitis- discuss sigmoidectomy surgery due to multiple diverticulitis flare ups, last cscope in 2023 by Dr. Jiménez, 1/8/24 CT Abd/Pel        History of Present Illness   This is a very nice 38-year-old female who presents to me for a surgical opinion regarding her history of recurrent episodes of diverticulitis.  Patient has had somewhere between 5-7 episodes of diverticulitis in her lifetime.    Patient's first lifetime attack of diverticulitis was in February 2017.  She had a CT scan on 2/18/2017 showing inflammation around the proximal sigmoid colon with small foci of extraluminal gas.  She had another attack confirmed by CT scan on 6/10/2021 showing descending colon diverticulitis.  Her most recent attack occurred between November 23 to January 2024.  She had a CT on 11/1/2023 showing acute uncomplicated diverticulitis of the descending colon.  Her most recent CT scan was performed on 1/8/2024 and showed acute diverticulitis in the distal descending/proximal sigmoid colon.    She underwent a colonoscopy in September 2017 with findings of good prep, 5 mm sessile transverse colon adenoma, mild sigmoid colon diverticulosis. She underwent a surveillance colonoscopy January 2023 for history of adenomatous colon polyps with findings of a diminutive colon polyp (hyperplastic), diverticulosis and hemorrhoids.    Patient has never been hospitalized for diverticulitis.  Patient believes her attacks are typically triggered by consumption of foods with seeds.  Her symptoms are typically characterized by left lower quadrant abdominal pain.  No prior abdominal surgery.  No blood thinners.  Patient has been completely asymptomatic since January of this year.  She denies any diarrhea or constipation.  No rectal bleeding.  She takes Benefiber daily.   No family history of colon or rectal cancer or diverticulitis.      Allergies  Aurelia has No Known Allergies.    Past Medical / Surgical / Social / Family History    The past medical and past surgical history have been reviewed by me today.    Past Medical History:    Diverticulitis    High cholesterol    History of pregnancy    Delicia,JLK, , APGAR:8 (1 min) 9 (5 min)  (10 min)  -2nd degree perineal laceration; Pregnancy management: -Cervidil / Pitocin    Hypercholesterolemia with hypertriglyceridemia    Knee injury    knee cap damage, arthroscopy     Knee pain, left    arthroscopy     Obesity (BMI 30-39.9)    Ruptured ovarian cyst    u/s and resolved    Tubular adenoma of colon     Past Surgical History:   Procedure Laterality Date    Colonoscopy  2017    Colonoscopy N/A 2017    Procedure: COLONOSCOPY;  Surgeon: Matt Jiménez MD;  Location: [facility] ENDOSCOPY    Colonoscopy  2023    Knee arthroscopy  ,     knee cap damage; L knee pain    Needle biopsy right  10/02/2023    2 site ultrasound guided    Other surgical history      L orthoscopic knee       The family history and social history have been reviewed by me today.    Family History   Problem Relation Age of Onset    Heart Disease Mother 45        CAD    Hypertension Mother 45    Diabetes Mother 45        DM    Obesity Mother     Other (multisystem organ failure) Mother 56    Colon Polyps Father     Diabetes Maternal Grandmother     Obesity Maternal Grandmother     Pancreatic Cancer Maternal Uncle 60    Cancer Neg      Social History     Socioeconomic History    Marital status:    Tobacco Use    Smoking status: Never    Smokeless tobacco: Never   Vaping Use    Vaping status: Never Used   Substance and Sexual Activity    Alcohol use: Yes     Comment: socially    Drug use: No    Sexual activity: Yes     Birth control/protection: Vasectomy     Comment: none   Other Topics Concern    Caffeine Concern Yes      Comment: soda, coffee, 1 cup daily    Pt has a pacemaker No    Pt has a defibrillator No    Reaction to local anesthetic No        Current Outpatient Medications:     acidophilus-pectin Oral Cap, Take 1 capsule by mouth daily., Disp: , Rfl:     Phentermine HCl 15 MG Oral Cap, Take 1 capsule (15 mg total) by mouth every morning., Disp: 30 capsule, Rfl: 2    topiramate 25 MG Oral Tab, Take 1 tablet (25 mg total) by mouth 2 (two) times daily. Start 1 tab daily for 7 days, then can increase to twice a day as prescribed, Disp: 60 tablet, Rfl: 2    Testosterone Does not apply Powder, , Disp: , Rfl:     Ascorbic Acid (VITAMIN C) 100 MG Oral Tab, Take 1 tablet (100 mg total) by mouth daily., Disp: , Rfl:     Ergocalciferol (VITAMIN D OR), Take 1 tablet by mouth daily., Disp: , Rfl:     Omega-3 Fatty Acids (FISH OIL OR), Take by mouth daily., Disp: , Rfl:       Review of Systems  A 10 point review of systems was performed and negative unless otherwise documented per HPI.    Physical Findings   Pulse 60   Temp 96.9 °F (36.1 °C) (Temporal)   LMP 07/12/2024 (Exact Date)   Physical Exam  Vitals and nursing note reviewed. Exam conducted with a chaperone present.   Constitutional:       General: She is not in acute distress.  HENT:      Head: Normocephalic and atraumatic.      Mouth/Throat:      Mouth: Mucous membranes are moist.   Cardiovascular:      Rate and Rhythm: Normal rate and regular rhythm.   Pulmonary:      Effort: Pulmonary effort is normal.   Abdominal:      General: There is no distension.      Palpations: Abdomen is soft.      Tenderness: There is no abdominal tenderness.   Musculoskeletal:         General: No deformity.   Skin:     General: Skin is warm and dry.   Neurological:      General: No focal deficit present.      Mental Status: She is alert.   Psychiatric:         Mood and Affect: Mood normal.     I have personally reviewed the imaging of patient's CT scan of the abdomen and pelvis from 1/8/2024        Assessment   1. Diverticulitis of sigmoid colon        This is a very nice 38-year-old female who presents to me for a surgical opinion regarding her history of recurrent episodes of diverticulitis.  Patient has had somewhere between 5-7 episodes of diverticulitis in her lifetime.    Patient's first lifetime attack of diverticulitis was in February 2017.  She had a CT scan on 2/18/2017 showing inflammation around the proximal sigmoid colon with small foci of extraluminal gas.  She had another attack confirmed by CT scan on 6/10/2021 showing descending colon diverticulitis.  Her most recent attack occurred between November 23 to January 2024.  She had a CT on 11/1/2023 showing acute uncomplicated diverticulitis of the descending colon.  Her most recent CT scan was performed on 1/8/2024 and showed acute diverticulitis in the distal descending/proximal sigmoid colon.    She underwent a colonoscopy in September 2017 with findings of good prep, 5 mm sessile transverse colon adenoma, mild sigmoid colon diverticulosis. She underwent a surveillance colonoscopy January 2023 for history of adenomatous colon polyps with findings of a diminutive colon polyp (hyperplastic), diverticulosis and hemorrhoids.    Patient has never been hospitalized for diverticulitis.  Patient believes her attacks are typically triggered by consumption of foods with seeds.  Her symptoms are typically characterized by left lower quadrant abdominal pain.  No prior abdominal surgery.  No blood thinners.  Patient has been completely asymptomatic since January of this year.  She denies any diarrhea or constipation.  No rectal bleeding.  She takes Benefiber daily.  No family history of colon or rectal cancer or diverticulitis.    Plan  I counseled patient that current clinical care guidelines through the American Society of Colon & Rectal Surgeons suggests that \"the decision to recommend elective sigmoid colectomy after recovery from uncomplicated  acute diverticulitis should be individualized.\"  Furthermore, \"elective resection based on young age and presentation is not recommended\"    I do think patient would be a candidate for elective robotic colectomy, possible open, possible ostomy given her relatively frequent episodes at a young age.  The details of surgery were discussed including the expected recovery time, risks, benefits and alternatives. Specifically, risks of surgery were discussed including but not limited to pain, bleeding, infection, bowel or ureteral injury, and anastomotic complications such as leak, bleeding or stricture. There is also a risk of possible ostomy creation at the time of surgery.      Patients are generally hospitalized for 2-5 days after surgery.  She would avoid lifting anything heavier than 10 pounds for total of 6 weeks after surgery.  She would be on a soft diet for approximately 4 to 6 weeks after surgery.  Patient should complete a bowel prep and antibiotics per ERAS protocol prior to surgery.     Patient expressed understanding and wished to hold off on scheduling elective surgery at this time.  I did advise her to contact me in my office if she develops recurrent left lower quadrant pain similar to her prior attacks of diverticulitis and we may be able to expedite workup with CT scan and/or initiation of treatment with oral antibiotics.  Patient is welcome to follow-up with me as needed at this time.     No orders of the defined types were placed in this encounter.      Imaging & Referrals   None    Follow Up  No follow-ups on file.    Gavin Fisher MD

## 2024-08-06 ENCOUNTER — APPOINTMENT (OUTPATIENT)
Dept: URBAN - METROPOLITAN AREA CLINIC 248 | Age: 39
Setting detail: DERMATOLOGY
End: 2024-08-08

## 2024-08-06 DIAGNOSIS — Z71.89 OTHER SPECIFIED COUNSELING: ICD-10-CM

## 2024-08-06 DIAGNOSIS — D18.0 HEMANGIOMA: ICD-10-CM

## 2024-08-06 DIAGNOSIS — D49.2 NEOPLASM OF UNSPECIFIED BEHAVIOR OF BONE, SOFT TISSUE, AND SKIN: ICD-10-CM

## 2024-08-06 DIAGNOSIS — L82.1 OTHER SEBORRHEIC KERATOSIS: ICD-10-CM

## 2024-08-06 DIAGNOSIS — B07.8 OTHER VIRAL WARTS: ICD-10-CM

## 2024-08-06 DIAGNOSIS — D22 MELANOCYTIC NEVI: ICD-10-CM

## 2024-08-06 DIAGNOSIS — L81.4 OTHER MELANIN HYPERPIGMENTATION: ICD-10-CM

## 2024-08-06 PROBLEM — D18.01 HEMANGIOMA OF SKIN AND SUBCUTANEOUS TISSUE: Status: ACTIVE | Noted: 2024-08-06

## 2024-08-06 PROBLEM — D22.5 MELANOCYTIC NEVI OF TRUNK: Status: ACTIVE | Noted: 2024-08-06

## 2024-08-06 PROBLEM — D23.61 OTHER BENIGN NEOPLASM OF SKIN OF RIGHT UPPER LIMB, INCLUDING SHOULDER: Status: ACTIVE | Noted: 2024-08-06

## 2024-08-06 PROCEDURE — OTHER MIPS QUALITY: OTHER

## 2024-08-06 PROCEDURE — OTHER BIOPSY BY SHAVE METHOD: OTHER

## 2024-08-06 PROCEDURE — OTHER LIQUID NITROGEN: OTHER

## 2024-08-06 PROCEDURE — OTHER COUNSELING: OTHER

## 2024-08-06 PROCEDURE — 99213 OFFICE O/P EST LOW 20 MIN: CPT | Mod: 25

## 2024-08-06 PROCEDURE — 11102 TANGNTL BX SKIN SINGLE LES: CPT | Mod: 59

## 2024-08-06 PROCEDURE — 17110 DESTRUCT B9 LESION 1-14: CPT

## 2024-08-06 ASSESSMENT — LOCATION ZONE DERM
LOCATION ZONE: NECK
LOCATION ZONE: TRUNK
LOCATION ZONE: TRUNK

## 2024-08-06 ASSESSMENT — LOCATION SIMPLE DESCRIPTION DERM
LOCATION SIMPLE: ABDOMEN
LOCATION SIMPLE: ABDOMEN
LOCATION SIMPLE: RIGHT ANTERIOR NECK

## 2024-08-06 ASSESSMENT — LOCATION DETAILED DESCRIPTION DERM
LOCATION DETAILED: PERIUMBILICAL SKIN
LOCATION DETAILED: RIGHT RIB CAGE
LOCATION DETAILED: LEFT RIB CAGE
LOCATION DETAILED: RIGHT CLAVICULAR NECK
LOCATION DETAILED: RIGHT RIB CAGE

## 2024-08-06 NOTE — PROCEDURE: BIOPSY BY SHAVE METHOD
Body Location Override (Optional - Billing Will Still Be Based On Selected Body Map Location If Applicable): left flank
Detail Level: Detailed
Depth Of Biopsy: dermis
Was A Bandage Applied: Yes
Size Of Lesion In Cm: 0
Biopsy Type: H and E
Biopsy Method: Dermablade
Anesthesia Type: 1% lidocaine with epinephrine
Anesthesia Volume In Cc: 0.5
Hemostasis: Drysol
Wound Care: Petrolatum
Dressing: bandage
Destruction After The Procedure: No
Type Of Destruction Used: Curettage
Curettage Text: The wound bed was treated with curettage after the biopsy was performed.
Cryotherapy Text: The wound bed was treated with cryotherapy after the biopsy was performed.
Electrodesiccation Text: The wound bed was treated with electrodesiccation after the biopsy was performed.
Electrodesiccation And Curettage Text: The wound bed was treated with electrodesiccation and curettage after the biopsy was performed.
Silver Nitrate Text: The wound bed was treated with silver nitrate after the biopsy was performed.
Lab: -[id number]
Consent: Written consent was obtained and risks were reviewed including but not limited to scarring, infection, bleeding, scabbing, incomplete removal, nerve damage and allergy to anesthesia.
Post-Care Instructions: I reviewed with the patient in detail post-care instructions. Patient is to keep the biopsy site dry overnight, and then apply bacitracin twice daily until healed. Patient may apply hydrogen peroxide soaks to remove any crusting.
Notification Instructions: Patient will be notified of biopsy results. However, patient instructed to call the office if not contacted within 2 weeks.
Billing Type: Third-Party Bill
Information: Selecting Yes will display possible errors in your note based on the variables you have selected. This validation is only offered as a suggestion for you. PLEASE NOTE THAT THE VALIDATION TEXT WILL BE REMOVED WHEN YOU FINALIZE YOUR NOTE. IF YOU WANT TO FAX A PRELIMINARY NOTE YOU WILL NEED TO TOGGLE THIS TO 'NO' IF YOU DO NOT WANT IT IN YOUR FAXED NOTE.

## 2024-08-12 NOTE — PROGRESS NOTES
Addended by: SHUKRI MONROY on: 8/12/2024 01:47 PM     Modules accepted: Level of Service     Nata Bass is a 37year old female. HPI:     HPI     Pt is here for evaluation of corneal scratch OD, follow up from urgent care 7/12/2020. Pt is taking Polymyxin B-Trimethoprim OD QID which has been helping.  Pt was seen at urgent care on 7/12/202 Alcohol use: Yes      Comment: socially    Drug use: No      Medications:  Current Outpatient Medications   Medication Sig Dispense Refill   • Polymyxin B-Trimethoprim 73651-4.1 UNIT/ML-% Ophthalmic Solution Apply 1 drop to eye 4 (four) times daily for 5

## 2024-08-15 ENCOUNTER — TELEPHONE (OUTPATIENT)
Dept: ORTHOPEDICS CLINIC | Facility: CLINIC | Age: 39
End: 2024-08-15

## 2024-08-15 DIAGNOSIS — Z01.89 ENCOUNTER FOR LOWER EXTREMITY COMPARISON IMAGING STUDY: Primary | ICD-10-CM

## 2024-08-15 DIAGNOSIS — M25.561 RIGHT KNEE PAIN, UNSPECIFIED CHRONICITY: ICD-10-CM

## 2024-08-15 NOTE — TELEPHONE ENCOUNTER
Patient is scheduled for right knee pain. Please advise if imaging is needed.  Future Appointments   Date Time Provider Department Center   8/19/2024 12:40 PM [id number]

## 2024-08-15 NOTE — TELEPHONE ENCOUNTER
Patient rescheduled  Future Appointments   Date Time Provider Department Center   8/19/2024 12:40 PM [id number]

## 2024-08-15 NOTE — TELEPHONE ENCOUNTER
Can we please have this patient scheduled with the appropriate provider? Right knee pain. Thanks!

## 2024-10-01 ENCOUNTER — TELEPHONE (OUTPATIENT)
Dept: OBGYN CLINIC | Facility: CLINIC | Age: 39
End: 2024-10-01

## 2024-10-01 DIAGNOSIS — R92.8 CATEGORY 3 MAMMOGRAPHY RESULT WITH SHORT FOLLOW-UP INTERVAL SUGGESTED FOR PROBABLY BENIGN FINDING: Primary | ICD-10-CM

## 2024-10-01 NOTE — TELEPHONE ENCOUNTER
Apryl in radiology called and informed orders placed. Will call Aurelia for follow up appointment.

## 2024-10-07 ENCOUNTER — HOSPITAL ENCOUNTER (OUTPATIENT)
Dept: MAMMOGRAPHY | Facility: HOSPITAL | Age: 39
Discharge: HOME | End: 2024-10-07
Attending: OBSTETRICS & GYNECOLOGY
Payer: COMMERCIAL

## 2024-10-07 DIAGNOSIS — R92.8 CATEGORY 3 MAMMOGRAPHY RESULT WITH SHORT FOLLOW-UP INTERVAL SUGGESTED FOR PROBABLY BENIGN FINDING: ICD-10-CM

## 2024-10-07 PROCEDURE — 77066 DX MAMMO INCL CAD BI: CPT | Performed by: OBSTETRICS & GYNECOLOGY

## 2024-10-07 PROCEDURE — 77062 BREAST TOMOSYNTHESIS BI: CPT | Performed by: OBSTETRICS & GYNECOLOGY

## 2024-11-22 ENCOUNTER — TELEPHONE (OUTPATIENT)
Dept: OBGYN CLINIC | Facility: CLINIC | Age: 39
End: 2024-11-22

## 2024-11-22 NOTE — TELEPHONE ENCOUNTER
Since IUD blood pressure has been high no menses and  said her body always feels hot  . Patient seem PCP told her to call office

## 2024-11-22 NOTE — TELEPHONE ENCOUNTER
Last annual: 10/23/2023 w/Dr. Kylah Easton IUD insertion: 11/9/2023    Left message to call back        Participant attended Diabetes #2 session today. Topics included: Prevention/detection/treatment of chronic complications; sleep apnea; Developing strategies to promote health/change behavior/recommended screenings; Developing strategies to address psychosocial issues; Goal setting. Participants goal/support plan includes Diabetes Goal:  To lower blood sugars. I will check my blood sugar two times a day, (before and after meals). Starting the end of August and report elevations to physician. Diabetes Plan:  Have Testing supplies. . Problems/barriers may be:none anticipated; comments:   Plan for follow up/Recommendations: mail follow up survey at 6 months and one year.

## 2024-11-22 NOTE — TELEPHONE ENCOUNTER
Aurelia calling to discuss elevated BP with IUD.  Mirena inserted 11/9/23.     She states over last 5-6 months has been having 1 day each month feeling very hot with facial flushing that turns her cheeks purple. She states this feels very hormonal, because she does not sweat when this occurs. Symptoms occur when a period would be due. Has had light cycles with IUD, last month did not bleed at all.     She states BP has been trending higher since IUD placed at office visits. Per Aurelia, BP in April was 151/76, October 149/74.   She then bought home cuff in October and had reading as high as 181/105.  She was seen by PCP today for elevated BP at home. BP with PCP today 163/95. PCP diagnosed primary HTN and started on Losartan 50 mg.   PCP directed her to reach out to us to discuss IUD, since patient feels symptoms may be related.   Will send message to Dr. Montero for review, Aurelia understands to reach out to PCP with any BP concerns in the meantime.     To Dr. Montero to advise on recommendations for IUD.

## 2025-02-20 ENCOUNTER — APPOINTMENT (OUTPATIENT)
Dept: URBAN - METROPOLITAN AREA CLINIC 248 | Age: 40
Setting detail: DERMATOLOGY
End: 2025-02-20

## 2025-02-20 DIAGNOSIS — L81.4 OTHER MELANIN HYPERPIGMENTATION: ICD-10-CM

## 2025-02-20 DIAGNOSIS — L57.8 OTHER SKIN CHANGES DUE TO CHRONIC EXPOSURE TO NONIONIZING RADIATION: ICD-10-CM

## 2025-02-20 DIAGNOSIS — L82.1 OTHER SEBORRHEIC KERATOSIS: ICD-10-CM

## 2025-02-20 DIAGNOSIS — Z71.89 OTHER SPECIFIED COUNSELING: ICD-10-CM

## 2025-02-20 DIAGNOSIS — D485 NEOPLASM OF UNCERTAIN BEHAVIOR OF SKIN: ICD-10-CM

## 2025-02-20 DIAGNOSIS — D22 MELANOCYTIC NEVI: ICD-10-CM

## 2025-02-20 DIAGNOSIS — D18.0 HEMANGIOMA: ICD-10-CM

## 2025-02-20 PROBLEM — D22.5 MELANOCYTIC NEVI OF TRUNK: Status: ACTIVE | Noted: 2025-02-20

## 2025-02-20 PROBLEM — D48.5 NEOPLASM OF UNCERTAIN BEHAVIOR OF SKIN: Status: ACTIVE | Noted: 2025-02-20

## 2025-02-20 PROBLEM — D18.01 HEMANGIOMA OF SKIN AND SUBCUTANEOUS TISSUE: Status: ACTIVE | Noted: 2025-02-20

## 2025-02-20 PROCEDURE — 11301 SHAVE SKIN LESION 0.6-1.0 CM: CPT | Mod: 76

## 2025-02-20 PROCEDURE — OTHER COUNSELING: OTHER

## 2025-02-20 PROCEDURE — OTHER SHAVE REMOVAL: OTHER

## 2025-02-20 PROCEDURE — 11301 SHAVE SKIN LESION 0.6-1.0 CM: CPT

## 2025-02-20 PROCEDURE — OTHER MIPS QUALITY: OTHER

## 2025-02-20 PROCEDURE — 99213 OFFICE O/P EST LOW 20 MIN: CPT | Mod: 25

## 2025-02-20 ASSESSMENT — LOCATION DETAILED DESCRIPTION DERM
LOCATION DETAILED: LEFT MID-UPPER BACK
LOCATION DETAILED: RIGHT INFERIOR LATERAL MIDBACK
LOCATION DETAILED: LEFT SUPERIOR MEDIAL UPPER BACK
LOCATION DETAILED: RIGHT PROXIMAL ULNAR DORSAL FOREARM
LOCATION DETAILED: INFERIOR THORACIC SPINE
LOCATION DETAILED: EPIGASTRIC SKIN
LOCATION DETAILED: LEFT MEDIAL SUPERIOR CHEST

## 2025-02-20 ASSESSMENT — LOCATION ZONE DERM
LOCATION ZONE: ARM
LOCATION ZONE: TRUNK

## 2025-02-20 ASSESSMENT — LOCATION SIMPLE DESCRIPTION DERM
LOCATION SIMPLE: LEFT UPPER BACK
LOCATION SIMPLE: UPPER BACK
LOCATION SIMPLE: CHEST
LOCATION SIMPLE: RIGHT FOREARM
LOCATION SIMPLE: ABDOMEN
LOCATION SIMPLE: RIGHT LOWER BACK

## 2025-02-20 NOTE — PROCEDURE: MIPS QUALITY
Quality 431: Preventive Care And Screening: Unhealthy Alcohol Use - Screening: Patient did not receive brief counseling if identified as an unhealthy alcohol user
Quality 394b: Td/Tdap Immunizations For Adolescents: Patient did not have one Tdap or one Td vaccine on or between the patient's 10th and 13th birthdays.
Quality 226: Preventive Care And Screening: Tobacco Use: Screening And Cessation Intervention: Patient screened for tobacco use and is an ex/non-smoker
Quality 358: Patient-Centered Surgical Risk Assessment And Communication: A patient-specific risk assessment with a risk calculator was not completed or communicated to patient and/or family.
Quality 47: Advance Care Plan: Advance care planning not documented, reason not otherwise specified.
Quality 394a: Meningococcal Immunizations For Adolescents: Patient did not have one dose of meningococcal vaccine on or between the patient's 11th and 13th birthdays.
Quality 394c: Hpv Vaccine For Adolescents: Patient did not have at least two HPV vaccines (with at least 146 days between the two) OR three HPV vaccines on or between the patient’s 9th and 13th birthdays.
Detail Level: Detailed

## 2025-02-20 NOTE — PROCEDURE: SHAVE REMOVAL
Render Post-Care Instructions In Note?: no
Size Of Lesion In Cm (Required): 0.6
Detail Level: Detailed
Wound Care: Petrolatum
Biopsy Method: Dermablade
Lab: -[id number]
Notification Instructions: Patient will be notified of pathology results. However, patient instructed to call the office if not contacted within 2 weeks.
Path Notes (To The Dermatopathologist): check margins
Add Variable For Additional Medical Justification: Yes
Hemostasis: Drysol
Consent was obtained from the patient. The risks and benefits to therapy were discussed in detail. Specifically, the risks of infection, scarring, bleeding, prolonged wound healing, incomplete removal, allergy to anesthesia, nerve injury and recurrence were addressed. Prior to the procedure, the treatment site was clearly identified and confirmed by the patient. All components of Universal Protocol/PAUSE Rule completed.
X Size Of Lesion In Cm (Optional): 0
Depth Of Shave: dermis
Body Location Override (Optional - Billing Will Still Be Based On Selected Body Map Location If Applicable): right medial elbow
Anesthesia Type: 1% lidocaine with epinephrine
Post-Care Instructions: I reviewed with the patient in detail post-care instructions. Patient is to keep the biopsy site dry overnight, and then apply bacitracin twice daily until healed. Patient may apply hydrogen peroxide soaks to remove any crusting.
Medical Necessity Information: It is in your best interest to select a reason for this procedure from the list below. All of these items fulfill various CMS LCD requirements except the new and changing color options.
Medical Necessity Clause: This procedure was medically necessary because the lesion that was treated was:
Billing Type: Third-Party Bill
Body Location Override (Optional - Billing Will Still Be Based On Selected Body Map Location If Applicable): right medial chest

## 2025-08-27 ENCOUNTER — APPOINTMENT (OUTPATIENT)
Dept: URBAN - METROPOLITAN AREA CLINIC 248 | Age: 40
Setting detail: DERMATOLOGY
End: 2025-08-27

## 2025-08-27 DIAGNOSIS — D485 NEOPLASM OF UNCERTAIN BEHAVIOR OF SKIN: ICD-10-CM

## 2025-08-27 DIAGNOSIS — L82.1 OTHER SEBORRHEIC KERATOSIS: ICD-10-CM

## 2025-08-27 DIAGNOSIS — L81.4 OTHER MELANIN HYPERPIGMENTATION: ICD-10-CM

## 2025-08-27 DIAGNOSIS — D18.0 HEMANGIOMA: ICD-10-CM

## 2025-08-27 DIAGNOSIS — L57.8 OTHER SKIN CHANGES DUE TO CHRONIC EXPOSURE TO NONIONIZING RADIATION: ICD-10-CM

## 2025-08-27 DIAGNOSIS — D22 MELANOCYTIC NEVI: ICD-10-CM

## 2025-08-27 DIAGNOSIS — Z71.89 OTHER SPECIFIED COUNSELING: ICD-10-CM

## 2025-08-27 DIAGNOSIS — B36.0 PITYRIASIS VERSICOLOR: ICD-10-CM

## 2025-08-27 PROBLEM — D22.5 MELANOCYTIC NEVI OF TRUNK: Status: ACTIVE | Noted: 2025-08-27

## 2025-08-27 PROBLEM — D48.5 NEOPLASM OF UNCERTAIN BEHAVIOR OF SKIN: Status: ACTIVE | Noted: 2025-08-27

## 2025-08-27 PROBLEM — D18.01 HEMANGIOMA OF SKIN AND SUBCUTANEOUS TISSUE: Status: ACTIVE | Noted: 2025-08-27

## 2025-08-27 PROCEDURE — OTHER SHAVE REMOVAL: OTHER

## 2025-08-27 PROCEDURE — 99213 OFFICE O/P EST LOW 20 MIN: CPT | Mod: 25

## 2025-08-27 PROCEDURE — OTHER COUNSELING: OTHER

## 2025-08-27 PROCEDURE — OTHER ADDITIONAL NOTES: OTHER

## 2025-08-27 PROCEDURE — 11310 SHAVE SKIN LESION 0.5 CM/<: CPT

## 2025-08-27 PROCEDURE — OTHER MIPS QUALITY: OTHER

## 2025-08-27 ASSESSMENT — LOCATION SIMPLE DESCRIPTION DERM
LOCATION SIMPLE: RIGHT CHEEK
LOCATION SIMPLE: RIGHT LOWER BACK
LOCATION SIMPLE: ABDOMEN
LOCATION SIMPLE: UPPER BACK
LOCATION SIMPLE: LEFT UPPER BACK

## 2025-08-27 ASSESSMENT — LOCATION DETAILED DESCRIPTION DERM
LOCATION DETAILED: RIGHT INFERIOR LATERAL MIDBACK
LOCATION DETAILED: LEFT SUPERIOR MEDIAL UPPER BACK
LOCATION DETAILED: INFERIOR THORACIC SPINE
LOCATION DETAILED: SUPERIOR THORACIC SPINE
LOCATION DETAILED: EPIGASTRIC SKIN
LOCATION DETAILED: LEFT MID-UPPER BACK
LOCATION DETAILED: RIGHT INFERIOR CENTRAL MALAR CHEEK

## 2025-08-27 ASSESSMENT — LOCATION ZONE DERM
LOCATION ZONE: FACE
LOCATION ZONE: TRUNK

## (undated) DEVICE — ENDOSCOPY PACK - LOWER: Brand: MEDLINE INDUSTRIES, INC.

## (undated) DEVICE — SNARE CAPTIFLEX MICRO-OVL OLY

## (undated) DEVICE — Device: Brand: DEFENDO AIR/WATER/SUCTION AND BIOPSY VALVE

## (undated) NOTE — LETTER
AUTHORIZATION FOR SURGICAL OPERATION OR OTHER PROCEDURE    1. I hereby authorize Dr. Gael Gates, and [place] [employer] [facility] staff assigned to my case to perform the following operation and/or procedure at the [facility], [facility]:    _______________________________________________________________________________________________  IUD Insertion      _______________________________________________________________________________________________    2. My physician has explained the nature and purpose of the operation or other procedure, possible alternative methods of treatment, the risks involved, and the possibility of complication to me. I acknowledge that no guarantee has been made as to the result that may be obtained. 3.  I recognize that, during the course of this operation, or other procedure, unforseen conditions may necessitate additional or different procedure than those listed above. I, therefore, further authorize and request that the above named physician, his/her physician assistants or designees perform such procedures as are, in his/her professional opinion, necessary and desirable. 4.  Any tissue or organs removed in the operation or other procedure may be disposed of by and at the discretion of the [facility], [facility] and [facility] AT [facility]. 5.  I understand that in the event of a medical emergency, I will be transported by local paramedics to [facility] or other [facility] emergency department. 6.  I certify that I have read and fully understand the above consent to operation and/or other procedure. 7.  I acknowledge that my physician has explained sedation/analgesia administration to me including the risks and benefits. I consent to the administration of sedation/analgesia as may be necessary or desirable in the judgement of my physician.     Witness signature: ___________________________________________________ Date:  ______/______/_____                    Time:  ________ A.M.  P.M. Patient Name:  ______________________________________________________  (please print)      Patient signature:  ___________________________________________________             Relationship to Patient:           []  Parent    Responsible person                          []  Spouse  In case of minor or                    [] Other  _____________   Incompetent name:  __________________________________________________                               (please print)      _____________      Responsible person  In case of minor or  Incompetent signature:  _______________________________________________    Statement of Physician  My signature below affirms that prior to the time of the procedure, I have explained to the patient and/or his/her guardian, the risks and benefits involved in the proposed treatment and any reasonable alternative to the proposed treatment. I have also explained the risks and benefits involved in the refusal of the proposed treatment and have answered the patient's questions.                         Date:  ______/______/_______  Provider                      Signature:  __________________________________________________________       Time:  ___________ A.M    P.M.

## (undated) NOTE — LETTER
12/01/21        Alexandre Sake [facility]  [address]      [facility] records indicate that you have outstanding lab work and or testing that was ordered for you and has not yet been completed:  Orders Placed This Encounter      TSH

## (undated) NOTE — ED AVS SNAPSHOT
[facility] Emergency Department    [address]     [address] [place] [address]    Phone:  [phone]    Fax:  [address]   MRN: [id number]    Department:  [facility] Emergency Department   Date of Visit:  2/1 aspect of your visit today. In an effort to constantly improve our service to you, we would appreciate any positive or negative feedback related to the care you received in our emergency department. Please call our [address] at [phone].   Your [facility] contact you. Please make sure we have your correct phone number on file.       I certified that I have received a copy of the aftercare instructions; that these instructions have been explained to me; all questions pertaining to these instructions have bee Summaries. If you've been to the Emergency Department or your doctor's office, you can view your past visit information in Scripted by going to Visits < Visit Summaries. Scripted questions? Call [phone] for help.   Scripted is NOT to be used for urge

## (undated) NOTE — LETTER
5/20/2022    Formerly named [facility]        True            Dear Formerly named [facility],      Our records indicate that you are due for an appointment for a Colonoscopy in September 2022, or sometime there after, with Brandie Friend MD. Our doctors are booking about 2-4 months in advance for procedures. Please call our office to schedule this appointment. Your medical well-being is important to us. If your insurance requires a referral, please call your primary care office to request one.       Thank you,      The Physicians and Staff at [facility]

## (undated) NOTE — LETTER
July 15, 2020    Susan Oden DO  [address] [facility] [address] [facility]     Patient: Kendell Gramajo   YOB: 1985   Date of Visit: 7/15/2020       Dear Dr. Iveth Morataya DO:    Thank you for referring Kacy Zurita to me for evalu • Diabetes Mother 39        DM   • Obesity Mother    • Other (multisystem organ failure) Mother 64   • Diabetes Maternal Grandmother    • Obesity Maternal Grandmother    • Cancer Neg        Social History: Social History    Tobacco Use      Smoking status: No orders of the defined types were placed in this encounter. Meds This Visit:  Requested Prescriptions      No prescriptions requested or ordered in this encounter        Follow up instructions:  Return if symptoms worsen or fail to improve.     7/15/

## (undated) NOTE — LETTER
[facility], [address] [address] [phone]                09/18/17      [place] [facility] [place] [address]        Dear Lopez Klein,    I reviewed the pathology rep

## (undated) NOTE — LETTER
02/04/22        Savannah Jacques Survance  [address]      Dear Alejo Harper,    [address] [facility] records indicate that you have outstanding lab work and or testing that was ordered for you and has not yet been completed:  Orders Placed This Encounter      C

## (undated) NOTE — LETTER
[address]  [facility] [address] [id number]  Authorization for Imaging Procedure  Date of Procedure:     I hereby authorize Dr. Magdalene Peabody , my physician and his/her assistants (if applicable), which may include medical students, residents, and/or fellows, to perform the following procedure and administer such anesthesia as may be determined necessary by my physician: ULTRASOUND GUIDED RIGHT BREAST BIOPSY 2 SITES WITH CLIP PLACEMENT TO [facility]. 2.  I recognize that during the procedure, unforeseen conditions may necessitate additional or different procedures than those listed above. I, therefore, further authorize and request that the above-named physician, assistants, or designees perform such procedures as are, in their judgment, necessary and desirable. 3.  My physician has discussed prior to my procedure the potential benefits, risks and side effects of this procedure; the likelihood of achieving goals; and potential problems that might occur during recuperation. They also discussed reasonable alternatives to the procedure, including risks, benefits, and side effects related to the alternatives and risks related to not receiving this procedure. I have had all my questions answered and I acknowledge that no guarantee has been made as to the result that may be obtained. 4.  Should the need arise during my procedure, which includes change of level of care prior to discharge, I also consent to the administration of blood and/or blood products. Further, I understand that despite careful testing and screening of blood or blood products by collecting agencies, I may still be subject to ill effects as a result of receiving a blood transfusion and/or blood products.  The following are some, but not all, of the potential risks that can occur: fever and allergic reactions, hemolytic reactions, transmission of diseases such as Hepatitis, AIDS and Cytomegalovirus (CMV) and fluid overload. In the event that I wish to have an autologous transfusion of my own blood, or a directed donor transfusion, I will discuss this with my physician. Check only if Refusing Blood or Blood Products  I understand refusal of blood or blood products as deemed necessary by my physician may have serious consequences to my condition to include possible death. I hereby assume responsibility for my refusal and release the hospital, its personnel, and my physicians from any responsibility for the consequences of my refusal.   [  ] Patient Refuses Blood      5. I authorize the use of any specimen, organs, tissues, body parts or foreign objects that may be removed from my body during the procedure for diagnosis, research or teaching purposes and their subsequent disposal by hospital authorities. I also authorize the release of specimen test results and/or written reports to my treating physician on the hospital medical staff or other referring or consulting physicians involved in my care, at the discretion of the Pathologist or my treating physician. 6.  I consent to the photographing or videotaping of the procedures to be performed, including appropriate portions of my body for medical, scientific, or educational purposes, provided my identity is not revealed by the pictures or by descriptive texts accompanying them. If the procedure has been photographed/videotaped, the physician will obtain the original picture, image, videotape or CD. The hospital will not be responsible for storage, release or maintenance of the picture, image, tape or CD.   7.  I consent to the presence of a  or observers in the operating room as deemed necessary by my physician or their designees. 8.  I recognize that in the event my procedure results in extended X-Ray/fluoroscopy time, I may develop a skin reaction. 9.   If I have a Do Not Attempt Resuscitation (DNAR) order in place, that status will be suspended while in the operating room, procedural suite, and during the recovery period unless otherwise explicitly stated by me (or a person authorized to consent on my behalf). The performing physician or my attending physician will determine when the applicable recovery period ends for purposes of reinstating the DNAR order. 10.  I acknowledge that my physician has explained sedation/analgesia administration to me including the risk and benefits I consent to the administration of sedation/analgesia as may be necessary or desirable in the judgment of my physician. I CERTIFY THAT I HAVE READ AND FULLY UNDERSTAND THE ABOVE CONSENT FOR THE PROCEDURE. Signature of Patient: _____________________________________________________________  Responsible person in case of minor, unconscious: ____________________________________  Relationship to patient:  __________________________________________________________  Signature of Witness: _______________________________Date: _________Time: __________    Statement of Physician: My signature below affirms that prior to the time of the procedure, I have explained to the patient and/or her guardian, the risks and benefits involved in the proposed treatment and any reasonable alternative to the proposed treatment. I have also explained the risks and benefits involved in the refusal of the proposed treatment and have answered the patient's questions. If I have a significant financial interest in a co-management agreement or a significant financial interest in any product or implant, or other significant relationship used in the procedure/surgery, I have disclosed this and had a discussion with my patient.   Signature of Physician:   _________________________________Date:_____________Time:________    Patient Name: Carrol Hodgkin : 1985  Printed: 2023   Medical Record #: [id number]

## (undated) NOTE — ED AVS SNAPSHOT
[facility] Emergency Department    [address]     [address] [place] [address]    Phone:  [phone]    Fax:  [address]   MRN: [id number]    Department:  [facility] Emergency Department   Date of Visit:  2/1 and Class Registration line at [phone] or find a doctor online by visiting [email or web address].    IF THERE IS ANY CHANGE OR WORSENING OF YOUR CONDITION, CALL YOUR PRIMARY CARE PHYSICIAN AT ONCE OR RETURN IMMEDIATELY TO [address] [place] [address].     If

## (undated) NOTE — LETTER
24    Patient: Aurelia Dean  : 1985 Visit date: 2024    Dear  Matt Jiménez MD    Thank you for referring Aurelia Dean to my practice.  Please find my assessment and plan below.     Assessment   1. Diverticulitis of sigmoid colon        This is a very nice 38-year-old female who presents to me for a surgical opinion regarding her history of recurrent episodes of diverticulitis.  Patient has had somewhere between 5-7 episodes of diverticulitis in her lifetime.    Patient's first lifetime attack of diverticulitis was in 2017.  She had a CT scan on 2017 showing inflammation around the proximal sigmoid colon with small foci of extraluminal gas.  She had another attack confirmed by CT scan on 6/10/2021 showing descending colon diverticulitis.  Her most recent attack occurred between  to 2024.  She had a CT on 2023 showing acute uncomplicated diverticulitis of the descending colon.  Her most recent CT scan was performed on 2024 and showed acute diverticulitis in the distal descending/proximal sigmoid colon.    She underwent a colonoscopy in 2017 with findings of good prep, 5 mm sessile transverse colon adenoma, mild sigmoid colon diverticulosis. She underwent a surveillance colonoscopy 2023 for history of adenomatous colon polyps with findings of a diminutive colon polyp (hyperplastic), diverticulosis and hemorrhoids.    Patient has never been hospitalized for diverticulitis.  Patient believes her attacks are typically triggered by consumption of foods with seeds.  Her symptoms are typically characterized by left lower quadrant abdominal pain.  No prior abdominal surgery.  No blood thinners.  Patient has been completely asymptomatic since January of this year.  She denies any diarrhea or constipation.  No rectal bleeding.  She takes Benefiber daily.  No family history of colon or rectal cancer or diverticulitis.    Plan  I  counseled patient that current clinical care guidelines through the American Society of Colon & Rectal [employer]geons suggests that \"the decision to recommend elective sigmoid colectomy after recovery from uncomplicated acute diverticulitis should be individualized.\"  Furthermore, \"elective resection based on young age and presentation is not recommended\"    I do think patient would be a candidate for elective robotic colectomy, possible open, possible ostomy given her relatively frequent episodes at a young age.  The details of surgery were discussed including the expected recovery time, risks, benefits and alternatives. Specifically, risks of surgery were discussed including but not limited to pain, bleeding, infection, bowel or ureteral injury, and anastomotic complications such as leak, bleeding or stricture. There is also a risk of possible ostomy creation at the time of surgery.      Patients are generally hospitalized for 2-5 days after surgery.  She would avoid lifting anything heavier than 10 pounds for total of 6 weeks after surgery.  She would be on a soft diet for approximately 4 to 6 weeks after surgery.  Patient should complete a bowel prep and antibiotics per ERAS protocol prior to surgery.     Patient expressed understanding and wished to hold off on scheduling elective surgery at this time.  I did advise her to contact me in my office if she develops recurrent left lower quadrant pain similar to her prior attacks of diverticulitis and we may be able to expedite workup with CT scan and/or initiation of treatment with oral antibiotics.  Patient is welcome to follow-up with me as needed at this time.      Sincerely,       Gavin Fisher MD   CC:   No Recipients

## (undated) NOTE — LETTER
12/01/21        Karon Soler Survance  [address]      Dear Ella Brown records indicate that you have outstanding lab work and or testing that was ordered for you and has not yet been completed:  Orders Placed This Encounter      U

## (undated) NOTE — LETTER
[facility] [facility]  [address] [place] [address]  Dept: [phone]  Dept Fax: [phone]         March 9, 2020    Patient: Ayde Ramirez   YOB: 1985   Date of Visit: 3/9/2020       To Whom It